# Patient Record
Sex: FEMALE | Race: WHITE | NOT HISPANIC OR LATINO | Employment: OTHER | URBAN - NONMETROPOLITAN AREA
[De-identification: names, ages, dates, MRNs, and addresses within clinical notes are randomized per-mention and may not be internally consistent; named-entity substitution may affect disease eponyms.]

---

## 2017-03-02 RX ORDER — CLOPIDOGREL BISULFATE 75 MG/1
TABLET ORAL
Qty: 30 TABLET | Refills: 5 | OUTPATIENT
Start: 2017-03-02

## 2017-04-25 RX ORDER — ATENOLOL 50 MG/1
TABLET ORAL
Qty: 180 TABLET | Refills: 0 | OUTPATIENT
Start: 2017-04-25

## 2017-06-13 ENCOUNTER — TELEPHONE (OUTPATIENT)
Dept: CARDIOLOGY | Facility: CLINIC | Age: 77
End: 2017-06-13

## 2017-06-13 NOTE — TELEPHONE ENCOUNTER
Patient had called and left message saying that she is allergic to plavix and that it was making her mouth breakout and have chapped lips. She said she is allergic to a lot of medications and would like to come in and be seen to talk about medication options rather than just having a medication called in. Her next appointment is with Luiza on 9/21/17. What are your recommendations?

## 2017-06-23 ENCOUNTER — OFFICE VISIT (OUTPATIENT)
Dept: CARDIOLOGY | Facility: CLINIC | Age: 77
End: 2017-06-23

## 2017-06-23 VITALS
DIASTOLIC BLOOD PRESSURE: 82 MMHG | HEIGHT: 59 IN | WEIGHT: 144 LBS | BODY MASS INDEX: 29.03 KG/M2 | SYSTOLIC BLOOD PRESSURE: 120 MMHG | HEART RATE: 72 BPM

## 2017-06-23 DIAGNOSIS — Z86.73 OLD CEREBROVASCULAR ACCIDENT (CVA) WITHOUT LATE EFFECT: ICD-10-CM

## 2017-06-23 DIAGNOSIS — E78.49 OTHER HYPERLIPIDEMIA: ICD-10-CM

## 2017-06-23 DIAGNOSIS — R22.0 TONGUE SWELLING: ICD-10-CM

## 2017-06-23 DIAGNOSIS — I34.0 NON-RHEUMATIC MITRAL REGURGITATION: ICD-10-CM

## 2017-06-23 DIAGNOSIS — E53.9 VITAMIN B DEFICIENCY: ICD-10-CM

## 2017-06-23 DIAGNOSIS — I10 ESSENTIAL HYPERTENSION: Primary | ICD-10-CM

## 2017-06-23 PROBLEM — E53.8 VITAMIN B 12 DEFICIENCY: Status: ACTIVE | Noted: 2017-06-23

## 2017-06-23 PROCEDURE — 99214 OFFICE O/P EST MOD 30 MIN: CPT | Performed by: NURSE PRACTITIONER

## 2017-06-23 RX ORDER — RANITIDINE HCL 75 MG
75 TABLET ORAL DAILY PRN
COMMUNITY
End: 2020-01-09 | Stop reason: ALTCHOICE

## 2017-06-23 RX ORDER — CLOPIDOGREL BISULFATE 75 MG/1
75 TABLET ORAL DAILY
Qty: 90 TABLET | Refills: 2 | Status: SHIPPED | OUTPATIENT
Start: 2017-06-23 | End: 2018-01-02 | Stop reason: SDUPTHER

## 2017-06-23 RX ORDER — ATENOLOL 50 MG/1
50 TABLET ORAL DAILY
Qty: 90 TABLET | Refills: 2 | Status: SHIPPED | OUTPATIENT
Start: 2017-06-23 | End: 2018-01-02 | Stop reason: SDUPTHER
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
## 2017-07-06 ENCOUNTER — OUTSIDE FACILITY SERVICE (OUTPATIENT)
Dept: CARDIOLOGY | Facility: CLINIC | Age: 77
End: 2017-07-06

## 2017-07-06 ENCOUNTER — HOSPITAL ENCOUNTER (OUTPATIENT)
Dept: CARDIOLOGY | Facility: HOSPITAL | Age: 77
Discharge: HOME | End: 2017-07-06
Admitting: NURSE PRACTITIONER

## 2017-07-06 DIAGNOSIS — I34.0 NON-RHEUMATIC MITRAL REGURGITATION: ICD-10-CM

## 2017-07-06 PROCEDURE — 93306 TTE W/DOPPLER COMPLETE: CPT | Performed by: INTERNAL MEDICINE

## 2017-07-06 PROCEDURE — 93306 TTE W/DOPPLER COMPLETE: CPT

## 2018-01-02 ENCOUNTER — OFFICE VISIT (OUTPATIENT)
Dept: CARDIOLOGY | Facility: CLINIC | Age: 78
End: 2018-01-02

## 2018-01-02 ENCOUNTER — LAB (OUTPATIENT)
Dept: LAB | Facility: HOSPITAL | Age: 78
End: 2018-01-02

## 2018-01-02 VITALS
BODY MASS INDEX: 28.83 KG/M2 | SYSTOLIC BLOOD PRESSURE: 130 MMHG | HEART RATE: 72 BPM | DIASTOLIC BLOOD PRESSURE: 88 MMHG | WEIGHT: 143 LBS | HEIGHT: 59 IN

## 2018-01-02 DIAGNOSIS — R73.9 HYPERGLYCEMIA: ICD-10-CM

## 2018-01-02 DIAGNOSIS — E53.8 VITAMIN B 12 DEFICIENCY: ICD-10-CM

## 2018-01-02 DIAGNOSIS — E78.00 HYPERCHOLESTEREMIA: ICD-10-CM

## 2018-01-02 DIAGNOSIS — I10 ESSENTIAL HYPERTENSION: ICD-10-CM

## 2018-01-02 DIAGNOSIS — Z79.899 MEDICATION MANAGEMENT: ICD-10-CM

## 2018-01-02 DIAGNOSIS — I10 ESSENTIAL HYPERTENSION: Primary | ICD-10-CM

## 2018-01-02 DIAGNOSIS — I34.0 NON-RHEUMATIC MITRAL REGURGITATION: ICD-10-CM

## 2018-01-02 DIAGNOSIS — Z86.73 OLD CEREBROVASCULAR ACCIDENT (CVA) WITHOUT LATE EFFECT: ICD-10-CM

## 2018-01-02 LAB
ALBUMIN SERPL-MCNC: 4.4 G/DL (ref 3.4–4.8)
ALBUMIN/GLOB SERPL: 1.3 G/DL (ref 1.5–2.5)
ALP SERPL-CCNC: 77 U/L (ref 35–104)
ALT SERPL W P-5'-P-CCNC: 13 U/L (ref 10–36)
ANION GAP SERPL CALCULATED.3IONS-SCNC: 7.4 MMOL/L (ref 3.6–11.2)
AST SERPL-CCNC: 19 U/L (ref 10–30)
BILIRUB SERPL-MCNC: 0.8 MG/DL (ref 0.2–1.8)
BUN BLD-MCNC: 19 MG/DL (ref 7–21)
BUN/CREAT SERPL: 15.8 (ref 7–25)
CALCIUM SPEC-SCNC: 10.4 MG/DL (ref 7.7–10)
CHLORIDE SERPL-SCNC: 106 MMOL/L (ref 99–112)
CHOLEST SERPL-MCNC: 193 MG/DL (ref 0–200)
CO2 SERPL-SCNC: 28.6 MMOL/L (ref 24.3–31.9)
CREAT BLD-MCNC: 1.2 MG/DL (ref 0.43–1.29)
GFR SERPL CREATININE-BSD FRML MDRD: 43 ML/MIN/1.73
GLOBULIN UR ELPH-MCNC: 3.5 GM/DL
GLUCOSE BLD-MCNC: 119 MG/DL (ref 70–110)
HBA1C MFR BLD: 6 % (ref 4.5–5.7)
HDLC SERPL-MCNC: 59 MG/DL (ref 60–100)
LDLC SERPL CALC-MCNC: 109 MG/DL (ref 0–100)
LDLC/HDLC SERPL: 1.84 {RATIO}
OSMOLALITY SERPL CALC.SUM OF ELEC: 286.5 MOSM/KG (ref 273–305)
POTASSIUM BLD-SCNC: 5 MMOL/L (ref 3.5–5.3)
PROT SERPL-MCNC: 7.9 G/DL (ref 6–8)
SODIUM BLD-SCNC: 142 MMOL/L (ref 135–153)
TRIGL SERPL-MCNC: 127 MG/DL (ref 0–150)
VIT B12 BLD-MCNC: 1076 PG/ML (ref 211–911)
VLDLC SERPL-MCNC: 25.4 MG/DL

## 2018-01-02 PROCEDURE — 83036 HEMOGLOBIN GLYCOSYLATED A1C: CPT | Performed by: NURSE PRACTITIONER

## 2018-01-02 PROCEDURE — 80061 LIPID PANEL: CPT | Performed by: NURSE PRACTITIONER

## 2018-01-02 PROCEDURE — 82607 VITAMIN B-12: CPT | Performed by: NURSE PRACTITIONER

## 2018-01-02 PROCEDURE — 80053 COMPREHEN METABOLIC PANEL: CPT | Performed by: NURSE PRACTITIONER

## 2018-01-02 PROCEDURE — 36415 COLL VENOUS BLD VENIPUNCTURE: CPT

## 2018-01-02 PROCEDURE — 99213 OFFICE O/P EST LOW 20 MIN: CPT | Performed by: NURSE PRACTITIONER

## 2018-01-02 RX ORDER — ATENOLOL 50 MG/1
50 TABLET ORAL DAILY
Qty: 90 TABLET | Refills: 2 | Status: SHIPPED | OUTPATIENT
Start: 2018-01-02 | End: 2018-07-02 | Stop reason: SDUPTHER

## 2018-01-02 RX ORDER — CLOPIDOGREL BISULFATE 75 MG/1
75 TABLET ORAL DAILY
Qty: 90 TABLET | Refills: 2 | Status: SHIPPED | OUTPATIENT
Start: 2018-01-02 | End: 2018-07-02 | Stop reason: SDUPTHER

## 2018-01-02 NOTE — PROGRESS NOTES
Chief Complaint   Patient presents with   • Follow-up     For cardiac management. Last lab work was done on 06/26/17 per Luiza, in chart.    • Med Refill     Needs refills on cardiac medications. 90 day supply to [facility].       Subjective       Mirna Bravo is a 77 y.o. female  has history of hypertension, hypercholesterolemia who had a stroke about 3 years ago involving the right occipital lobe. She did have elevated cholesterol at that time, but has not able to tolerate any statins.   Today she comes to the office for a follow up visit and denies chest pain or palpitations. Her main issues are mid back pain due to kyphosis and oral issues related to sore gums and tongue. She admits to occasional issues with gastric reflux with certain foods.     HPI         Cardiac History:    Past Surgical History:   Procedure Laterality Date   • CARDIOVASCULAR STRESS TEST  06/06/2007    4 min,16 secs., 80% THR, Negative.   • ECHO - CONVERTED  06/06/2007    EF >60%, mild to moderate MR.   • ECHO - CONVERTED  07/01/2009    EF 50-55%, redundant mitral valve.   • ECHO - CONVERTED  03/07/2013    [facility]- EF 65%. Septal WMA.   • MRI INTRAOPERATIVE BRAIN W/ OR W/O CONTRAST  03/06/2013    Subacute Stroke in R Occipital Lobe.   • US CAROTID UNILATERAL  03/07/2013    Normal       Current Outpatient Prescriptions   Medication Sig Dispense Refill   • acetaminophen (TYLENOL) 325 MG tablet Take 650 mg by mouth every 6 (six) hours as needed for mild pain (1-3).     • atenolol (TENORMIN) 50 MG tablet Take 1 tablet by mouth Daily. 90 tablet 2   • calcium carbonate (OS-GRIS) 600 MG tablet Take 600 mg by mouth 2 (two) times a day with meals.     • cholecalciferol (VITAMIN D3) 1000 UNITS tablet Take 1,000 Units by mouth 2 (two) times a day.     • clopidogrel (PLAVIX) 75 MG tablet Take 1 tablet by mouth Daily. 90 tablet 2   • GARLIC PO Take  by mouth.     • Omega-3 Fatty Acids (FISH OIL) 1200 MG capsule delayed-release Take  by mouth 3  (three) times a day.     • raNITIdine (ZANTAC) 75 MG tablet Take 75 mg by mouth Daily As Needed for Indigestion or Heartburn.     • vitamin B-12 (CYANOCOBALAMIN) 1000 MCG tablet Take 1,000 mcg by mouth daily.       No current facility-administered medications for this visit.        Codeine; Levaquin [levofloxacin]; Niaspan [niacin er]; Penicillins; Pravastatin; Vytorin [ezetimibe-simvastatin]; and Zocor [simvastatin]    Past Medical History:   Diagnosis Date   • CVA (cerebral infarction)    • DVT of proximal leg (deep vein thrombosis)     Left leg   • History of bilateral cataract extraction    • History of kyphoplasty    • Hyperlipidemia    • Hypertension    • MVP (mitral valve prolapse)    • Osteopenia    • Osteoporosis    • PUD (peptic ulcer disease)        Social History     Social History   • Marital status:      Spouse name: N/A   • Number of children: N/A   • Years of education: N/A     Occupational History   • Not on file.     Social History Main Topics   • Smoking status: Never Smoker   • Smokeless tobacco: Never Used   • Alcohol use No   • Drug use: No   • Sexual activity: Not on file     Other Topics Concern   • Not on file     Social History Narrative       Family History   Problem Relation Age of Onset   • Stroke Mother    • Hypertension Mother    • Diabetes Mother    • Hypertension Other    • Hyperlipidemia Other    • Diabetes Other    • Hypertension Child    • Diabetes Child        Review of Systems   Constitution: Positive for malaise/fatigue. Negative for decreased appetite, diaphoresis and fever.   HENT: Positive for sore throat (tongue and upper throat area). Negative for congestion and nosebleeds.    Eyes: Negative for blurred vision and visual disturbance.   Cardiovascular: Negative for chest pain, dyspnea on exertion, leg swelling, near-syncope and palpitations.   Respiratory: Positive for shortness of breath. Negative for cough and sleep disturbances due to breathing (sleeps in recliner  "due to back pain).    Endocrine: Negative for polydipsia, polyphagia and polyuria.   Hematologic/Lymphatic: Negative for bleeding problem. Does not bruise/bleed easily.   Skin: Negative for itching, nail changes and rash.   Musculoskeletal: Positive for arthritis, back pain and falls. Negative for myalgias.   Gastrointestinal: Positive for heartburn (at times). Negative for abdominal pain, change in bowel habit, dysphagia, melena and nausea.   Genitourinary: Negative for dysuria and hematuria.   Neurological: Positive for loss of balance. Negative for dizziness, light-headedness, seizures and vertigo.   Psychiatric/Behavioral: Negative for altered mental status. The patient is not nervous/anxious.    Allergic/Immunologic: Negative for environmental allergies and hives.      Diabetes- No  Thyroid-normal    Objective     /88 (BP Location: Left arm)  Pulse 72  Ht 149.9 cm (59.02\")  Wt 64.9 kg (143 lb)  BMI 28.87 kg/m2    Physical Exam   Constitutional: She is oriented to person, place, and time. She appears well-nourished.   HENT:   Head: Normocephalic.   Mouth/Throat: No oral lesions.   Eyes: Conjunctivae are normal. Pupils are equal, round, and reactive to light.   Neck: Normal range of motion. Carotid bruit is not present. No thyromegaly present.   Cardiovascular: Normal rate, regular rhythm, S1 normal, S2 normal and normal pulses.    Murmur heard.   Systolic murmur is present with a grade of 2/6   Pulmonary/Chest: Effort normal and breath sounds normal. She has no wheezes. She has no rales.   Abdominal: Soft. Bowel sounds are normal. She exhibits no distension. There is no tenderness.   Musculoskeletal: Normal range of motion. She exhibits no edema.   Neurological: She is alert and oriented to person, place, and time.   Skin: Skin is warm and dry.   Psychiatric: She has a normal mood and affect. Her speech is normal and behavior is normal.   Vitals reviewed.     Procedures        Assessment/Plan  "     Mirna was seen today for follow-up and med refill.    Diagnoses and all orders for this visit:    Essential hypertension  -     Comprehensive Metabolic Panel; Future    Hypercholesteremia  -     Lipid Panel; Future    Non-rheumatic mitral regurgitation    Old cerebrovascular accident (CVA) without late effect    Hyperglycemia  -     Comprehensive Metabolic Panel; Future  -     Hemoglobin A1c; Future    Vitamin B 12 deficiency  -     Vitamin B12; Future    Medication management  -     Comprehensive Metabolic Panel; Future  -     Lipid Panel; Future  -     Vitamin B12; Future  -     Hemoglobin A1c; Future    Other orders  -     atenolol (TENORMIN) 50 MG tablet; Take 1 tablet by mouth Daily.  -     clopidogrel (PLAVIX) 75 MG tablet; Take 1 tablet by mouth Daily.      From a cardiac standpoint Ms. Bravo appears stable. Blood pressure and heart rate are normal. Same cardiac medications recommended and refills given. Last lab report reviewed which showed LDL elevated but fairly well controlled with diet given she is not able to tolerate lipid lowering agents. Regarding oral symptoms I advised her on referral to ENT or gastroenterologist. She plans to call to make appointment. Regarding back issues she follows with Dr. Basil Ortiz. Consider physical therapy for symptoms. A repeat lab order given to her as noted above. No cardiac testing warranted at this time. We will see her back in 6 months or sooner for problems.              Electronically signed by AMARILIS Mcgill,  January 2, 2018 11:47 AM

## 2018-01-02 NOTE — PATIENT INSTRUCTIONS
Fat and Cholesterol Restricted Diet  High levels of fat and cholesterol in your blood may lead to various health problems, such as diseases of the heart, blood vessels, gallbladder, liver, and pancreas. Fats are concentrated sources of energy that come in various forms. Certain types of fat, including saturated fat, may be harmful in excess. Cholesterol is a substance needed by your body in small amounts. Your body makes all the cholesterol it needs. Excess cholesterol comes from the food you eat.  When you have high levels of cholesterol and saturated fat in your blood, health problems can develop because the excess fat and cholesterol will gather along the walls of your blood vessels, causing them to narrow. Choosing the right foods will help you control your intake of fat and cholesterol. This will help keep the levels of these substances in your blood within normal limits and reduce your risk of disease.  WHAT IS MY PLAN?  Your health care provider recommends that you:  · Get no more than __________ % of the total calories in your daily diet from fat.  · Limit your intake of saturated fat to less than ______% of your total calories each day.  · Limit the amount of cholesterol in your diet to less than _________mg per day.  WHAT TYPES OF FAT SHOULD I CHOOSE?  · Choose healthy fats more often. Choose monounsaturated and polyunsaturated fats, such as olive and canola oil, flaxseeds, walnuts, almonds, and seeds.  · Eat more omega-3 fats. Good choices include salmon, mackerel, sardines, tuna, flaxseed oil, and ground flaxseeds. Aim to eat fish at least two times a week.  · Limit saturated fats. Saturated fats are primarily found in animal products, such as meats, butter, and cream. Plant sources of saturated fats include palm oil, palm kernel oil, and coconut oil.  · Avoid foods with partially hydrogenated oils in them. These contain trans fats. Examples of foods that contain trans fats are stick margarine, some tub  "margarines, cookies, crackers, and other baked goods.  WHAT GENERAL GUIDELINES DO I NEED TO FOLLOW?  These guidelines for healthy eating will help you control your intake of fat and cholesterol:  · Check food labels carefully to identify foods with trans fats or high amounts of saturated fat.  · Fill one half of your plate with vegetables and green salads.  · Fill one fourth of your plate with whole grains. Look for the word \"whole\" as the first word in the ingredient list.  · Fill one fourth of your plate with lean protein foods.  · Limit fruit to two servings a day. Choose fruit instead of juice.  · Eat more foods that contain soluble fiber. Examples of foods that contain this type of fiber are apples, broccoli, carrots, beans, peas, and barley. Aim to get 20-30 g of fiber per day.  · Eat more home-cooked food and less restaurant, buffet, and fast food.  · Limit or avoid alcohol.  · Limit foods high in starch and sugar.  · Limit fried foods.  · Cook foods using methods other than frying. Baking, boiling, grilling, and broiling are all great options.  · Lose weight if you are overweight. Losing just 5-10% of your initial body weight can help your overall health and prevent diseases such as diabetes and heart disease.  WHAT FOODS CAN I EAT?  Grains  Whole grains, such as whole wheat or whole grain breads, crackers, cereals, and pasta. Unsweetened oatmeal, bulgur, barley, quinoa, or brown rice. Corn or whole wheat flour tortillas.  Vegetables  Fresh or frozen vegetables (raw, steamed, roasted, or grilled). Green salads.  Fruits  All fresh, canned (in natural juice), or frozen fruits.  Meat and Other Protein Products  Ground beef (85% or leaner), grass-fed beef, or beef trimmed of fat. Skinless chicken or turkey. Ground chicken or turkey. Pork trimmed of fat. All fish and seafood. Eggs. Dried beans, peas, or lentils. Unsalted nuts or seeds. Unsalted canned or dry beans.  Dairy  Low-fat dairy products, such as skim or " 1% milk, 2% or reduced-fat cheeses, low-fat ricotta or cottage cheese, or plain low-fat yogurt.  Fats and Oils  Tub margarines without trans fats. Light or reduced-fat mayonnaise and salad dressings. Avocado. Olive, canola, sesame, or safflower oils. Natural peanut or almond butter (choose ones without added sugar and oil).  The items listed above may not be a complete list of recommended foods or beverages. Contact your dietitian for more options.  WHAT FOODS ARE NOT RECOMMENDED?  Grains  White bread. White pasta. White rice. Cornbread. Bagels, pastries, and croissants. Crackers that contain trans fat.  Vegetables  White potatoes. Corn. Creamed or fried vegetables. Vegetables in a cheese sauce.  Fruits  Dried fruits. Canned fruit in light or heavy syrup. Fruit juice.  Meat and Other Protein Products  Fatty cuts of meat. Ribs, chicken wings, wynne, sausage, bologna, salami, chitterlings, fatback, hot dogs, bratwurst, and packaged luncheon meats. Liver and organ meats.  Dairy  Whole or 2% milk, cream, half-and-half, and cream cheese. Whole milk cheeses. Whole-fat or sweetened yogurt. Full-fat cheeses. Nondairy creamers and whipped toppings. Processed cheese, cheese spreads, or cheese curds.  Sweets and Desserts  Corn syrup, sugars, honey, and molasses. Candy. Jam and jelly. Syrup. Sweetened cereals. Cookies, pies, cakes, donuts, muffins, and ice cream.  Fats and Oils  Butter, stick margarine, lard, shortening, ghee, or wynne fat. Coconut, palm kernel, or palm oils.  Beverages  Alcohol. Sweetened drinks (such as sodas, lemonade, and fruit drinks or punches).  The items listed above may not be a complete list of foods and beverages to avoid. Contact your dietitian for more information.     This information is not intended to replace advice given to you by your health care provider. Make sure you discuss any questions you have with your health care provider.     Document Released: 12/18/2006 Document Revised: 01/08/2016  Document Reviewed: 03/18/2015  [employer] Patient Education ©2017 [employer]

## 2018-05-21 RX ORDER — ATENOLOL 50 MG/1
TABLET ORAL
Qty: 90 TABLET | Refills: 2 | OUTPATIENT
Start: 2018-05-21

## 2018-07-02 ENCOUNTER — OFFICE VISIT (OUTPATIENT)
Dept: CARDIOLOGY | Facility: CLINIC | Age: 78
End: 2018-07-02

## 2018-07-02 ENCOUNTER — TELEPHONE (OUTPATIENT)
Dept: CARDIOLOGY | Facility: CLINIC | Age: 78
End: 2018-07-02

## 2018-07-02 ENCOUNTER — LAB (OUTPATIENT)
Dept: LAB | Facility: HOSPITAL | Age: 78
End: 2018-07-02

## 2018-07-02 VITALS
DIASTOLIC BLOOD PRESSURE: 80 MMHG | HEART RATE: 64 BPM | WEIGHT: 142 LBS | SYSTOLIC BLOOD PRESSURE: 120 MMHG | BODY MASS INDEX: 28.63 KG/M2 | HEIGHT: 59 IN

## 2018-07-02 DIAGNOSIS — E83.52 HYPERCALCEMIA: ICD-10-CM

## 2018-07-02 DIAGNOSIS — E78.00 PURE HYPERCHOLESTEROLEMIA: ICD-10-CM

## 2018-07-02 DIAGNOSIS — E53.8 VITAMIN B12 DEFICIENCY: ICD-10-CM

## 2018-07-02 DIAGNOSIS — E83.52 HYPERCALCEMIA: Primary | ICD-10-CM

## 2018-07-02 DIAGNOSIS — I10 ESSENTIAL HYPERTENSION: Primary | ICD-10-CM

## 2018-07-02 DIAGNOSIS — Z86.73 OLD CEREBROVASCULAR ACCIDENT (CVA) WITHOUT LATE EFFECT: ICD-10-CM

## 2018-07-02 DIAGNOSIS — I10 ESSENTIAL HYPERTENSION: ICD-10-CM

## 2018-07-02 DIAGNOSIS — E66.3 OVERWEIGHT: ICD-10-CM

## 2018-07-02 LAB
ALBUMIN SERPL-MCNC: 4.6 G/DL (ref 3.4–4.8)
ALBUMIN/GLOB SERPL: 1.3 G/DL (ref 1.5–2.5)
ALP SERPL-CCNC: 75 U/L (ref 35–104)
ALT SERPL W P-5'-P-CCNC: 19 U/L (ref 10–36)
ANION GAP SERPL CALCULATED.3IONS-SCNC: 8.8 MMOL/L (ref 3.6–11.2)
AST SERPL-CCNC: 22 U/L (ref 10–30)
BILIRUB SERPL-MCNC: 0.8 MG/DL (ref 0.2–1.8)
BUN BLD-MCNC: 24 MG/DL (ref 7–21)
BUN/CREAT SERPL: 18.8 (ref 7–25)
CALCIUM SPEC-SCNC: 10.8 MG/DL (ref 7.7–10)
CHLORIDE SERPL-SCNC: 105 MMOL/L (ref 99–112)
CHOLEST SERPL-MCNC: 189 MG/DL (ref 0–200)
CO2 SERPL-SCNC: 25.2 MMOL/L (ref 24.3–31.9)
CREAT BLD-MCNC: 1.28 MG/DL (ref 0.43–1.29)
DEPRECATED RDW RBC AUTO: 46.6 FL (ref 37–54)
ERYTHROCYTE [DISTWIDTH] IN BLOOD BY AUTOMATED COUNT: 14.2 % (ref 11.5–14.5)
GFR SERPL CREATININE-BSD FRML MDRD: 40 ML/MIN/1.73
GLOBULIN UR ELPH-MCNC: 3.6 GM/DL
GLUCOSE BLD-MCNC: 122 MG/DL (ref 70–110)
HCT VFR BLD AUTO: 45.6 % (ref 37–47)
HDLC SERPL-MCNC: 61 MG/DL (ref 60–100)
HGB BLD-MCNC: 14.8 G/DL (ref 12–16)
LDLC SERPL CALC-MCNC: 99 MG/DL (ref 0–100)
LDLC/HDLC SERPL: 1.62 {RATIO}
MCH RBC QN AUTO: 29.7 PG (ref 27–33)
MCHC RBC AUTO-ENTMCNC: 32.5 G/DL (ref 33–37)
MCV RBC AUTO: 91.4 FL (ref 80–94)
OSMOLALITY SERPL CALC.SUM OF ELEC: 282.9 MOSM/KG (ref 273–305)
PLATELET # BLD AUTO: 235 10*3/MM3 (ref 130–400)
PMV BLD AUTO: 11.4 FL (ref 6–10)
POTASSIUM BLD-SCNC: 4.3 MMOL/L (ref 3.5–5.3)
PROT SERPL-MCNC: 8.2 G/DL (ref 6–8)
PTH-INTACT SERPL-MCNC: 87.9 PG/ML
RBC # BLD AUTO: 4.99 10*6/MM3 (ref 4.2–5.4)
SODIUM BLD-SCNC: 139 MMOL/L (ref 135–153)
TRIGL SERPL-MCNC: 146 MG/DL (ref 0–150)
TSH SERPL DL<=0.05 MIU/L-ACNC: 2.16 MIU/ML (ref 0.55–4.78)
VIT B12 BLD-MCNC: 1560 PG/ML (ref 211–911)
VLDLC SERPL-MCNC: 29.2 MG/DL
WBC NRBC COR # BLD: 8.21 10*3/MM3 (ref 4.5–12.5)

## 2018-07-02 PROCEDURE — 83970 ASSAY OF PARATHORMONE: CPT | Performed by: NURSE PRACTITIONER

## 2018-07-02 PROCEDURE — 99213 OFFICE O/P EST LOW 20 MIN: CPT | Performed by: NURSE PRACTITIONER

## 2018-07-02 PROCEDURE — 84443 ASSAY THYROID STIM HORMONE: CPT | Performed by: NURSE PRACTITIONER

## 2018-07-02 PROCEDURE — 36415 COLL VENOUS BLD VENIPUNCTURE: CPT

## 2018-07-02 PROCEDURE — 82607 VITAMIN B-12: CPT | Performed by: NURSE PRACTITIONER

## 2018-07-02 PROCEDURE — 85027 COMPLETE CBC AUTOMATED: CPT | Performed by: NURSE PRACTITIONER

## 2018-07-02 PROCEDURE — 80061 LIPID PANEL: CPT | Performed by: NURSE PRACTITIONER

## 2018-07-02 PROCEDURE — 80053 COMPREHEN METABOLIC PANEL: CPT | Performed by: NURSE PRACTITIONER

## 2018-07-02 RX ORDER — ATENOLOL 50 MG/1
25 TABLET ORAL 2 TIMES DAILY
Qty: 90 TABLET | Refills: 2 | Status: SHIPPED | OUTPATIENT
Start: 2018-07-02 | End: 2019-01-08 | Stop reason: SDUPTHER

## 2018-07-02 RX ORDER — CLOPIDOGREL BISULFATE 75 MG/1
75 TABLET ORAL DAILY
Qty: 90 TABLET | Refills: 2 | Status: SHIPPED | OUTPATIENT
Start: 2018-07-02 | End: 2019-01-08 | Stop reason: SDUPTHER

## 2018-07-02 NOTE — PROGRESS NOTES
Calcium is high, please let her know to limit her supplement to once daily.  I am putting in an order for P[facility].  I will see if they can add to blood in lab.  B12 normal

## 2018-07-09 ENCOUNTER — TELEPHONE (OUTPATIENT)
Dept: CARDIOLOGY | Facility: CLINIC | Age: 78
End: 2018-07-09

## 2018-07-09 NOTE — TELEPHONE ENCOUNTER
Patient aware to come in tomorrow for EKG. Patient was told to come at 9 am, she verbalized understanding.

## 2018-07-09 NOTE — TELEPHONE ENCOUNTER
Please see if we can add her to the EKG schedule tomorrow if patient can be here.  I would like to reassess QT interval with magnesium change.

## 2018-07-10 ENCOUNTER — CLINICAL SUPPORT (OUTPATIENT)
Dept: CARDIOLOGY | Facility: CLINIC | Age: 78
End: 2018-07-10

## 2018-07-10 ENCOUNTER — TELEPHONE (OUTPATIENT)
Dept: CARDIOLOGY | Facility: CLINIC | Age: 78
End: 2018-07-10

## 2018-07-10 DIAGNOSIS — Z87.898 HX OF PROLONGED Q-T INTERVAL ON ECG: Primary | ICD-10-CM

## 2018-07-10 PROCEDURE — 93000 ELECTROCARDIOGRAM COMPLETE: CPT | Performed by: NURSE PRACTITIONER

## 2018-07-10 NOTE — PROGRESS NOTES
Procedure     ECG 12 Lead  Date/Time: 7/10/2018 1:40 PM  Performed by: LEFTY JOSHI  Authorized by: LEFTY JOSHI   Rhythm: sinus rhythm  BPM: 67  Clinical impression: abnormal ECG  Comments: Normal QT

## 2019-01-08 ENCOUNTER — OFFICE VISIT (OUTPATIENT)
Dept: CARDIOLOGY | Facility: CLINIC | Age: 79
End: 2019-01-08

## 2019-01-08 ENCOUNTER — LAB (OUTPATIENT)
Dept: LAB | Facility: HOSPITAL | Age: 79
End: 2019-01-08

## 2019-01-08 VITALS
HEART RATE: 72 BPM | SYSTOLIC BLOOD PRESSURE: 110 MMHG | DIASTOLIC BLOOD PRESSURE: 70 MMHG | WEIGHT: 141 LBS | HEIGHT: 59 IN | BODY MASS INDEX: 28.43 KG/M2

## 2019-01-08 DIAGNOSIS — E78.2 MIXED HYPERLIPIDEMIA: ICD-10-CM

## 2019-01-08 DIAGNOSIS — I10 ESSENTIAL HYPERTENSION: ICD-10-CM

## 2019-01-08 DIAGNOSIS — Z86.73 OLD CEREBROVASCULAR ACCIDENT (CVA) WITHOUT LATE EFFECT: ICD-10-CM

## 2019-01-08 DIAGNOSIS — Z51.81 MEDICATION MONITORING ENCOUNTER: ICD-10-CM

## 2019-01-08 DIAGNOSIS — I10 ESSENTIAL HYPERTENSION: Primary | ICD-10-CM

## 2019-01-08 LAB
ALBUMIN SERPL-MCNC: 4.6 G/DL (ref 3.4–4.8)
ALBUMIN/GLOB SERPL: 1.3 G/DL (ref 1.5–2.5)
ALP SERPL-CCNC: 87 U/L (ref 35–104)
ALT SERPL W P-5'-P-CCNC: 16 U/L (ref 10–36)
ANION GAP SERPL CALCULATED.3IONS-SCNC: 10.6 MMOL/L (ref 3.6–11.2)
AST SERPL-CCNC: 21 U/L (ref 10–30)
BILIRUB SERPL-MCNC: 0.7 MG/DL (ref 0.2–1.8)
BUN BLD-MCNC: 22 MG/DL (ref 7–21)
BUN/CREAT SERPL: 17.1 (ref 7–25)
CALCIUM SPEC-SCNC: 10.6 MG/DL (ref 7.7–10)
CHLORIDE SERPL-SCNC: 104 MMOL/L (ref 99–112)
CHOLEST SERPL-MCNC: 208 MG/DL (ref 0–200)
CO2 SERPL-SCNC: 25.4 MMOL/L (ref 24.3–31.9)
CREAT BLD-MCNC: 1.29 MG/DL (ref 0.43–1.29)
DEPRECATED RDW RBC AUTO: 46.1 FL (ref 37–54)
ERYTHROCYTE [DISTWIDTH] IN BLOOD BY AUTOMATED COUNT: 14.1 % (ref 11.5–14.5)
GFR SERPL CREATININE-BSD FRML MDRD: 40 ML/MIN/1.73
GLOBULIN UR ELPH-MCNC: 3.5 GM/DL
GLUCOSE BLD-MCNC: 129 MG/DL (ref 70–110)
HCT VFR BLD AUTO: 48.4 % (ref 37–47)
HDLC SERPL-MCNC: 60 MG/DL (ref 60–100)
HGB BLD-MCNC: 15.3 G/DL (ref 12–16)
LDLC SERPL CALC-MCNC: 116 MG/DL (ref 0–100)
LDLC/HDLC SERPL: 1.94 {RATIO}
MCH RBC QN AUTO: 29.1 PG (ref 27–33)
MCHC RBC AUTO-ENTMCNC: 31.6 G/DL (ref 33–37)
MCV RBC AUTO: 92.2 FL (ref 80–94)
OSMOLALITY SERPL CALC.SUM OF ELEC: 284.4 MOSM/KG (ref 273–305)
PLATELET # BLD AUTO: 243 10*3/MM3 (ref 130–400)
PMV BLD AUTO: 11.6 FL (ref 6–10)
POTASSIUM BLD-SCNC: 5.1 MMOL/L (ref 3.5–5.3)
PROT SERPL-MCNC: 8.1 G/DL (ref 6–8)
RBC # BLD AUTO: 5.25 10*6/MM3 (ref 4.2–5.4)
SODIUM BLD-SCNC: 140 MMOL/L (ref 135–153)
TRIGL SERPL-MCNC: 158 MG/DL (ref 0–150)
TSH SERPL DL<=0.05 MIU/L-ACNC: 3.14 MIU/ML (ref 0.55–4.78)
VLDLC SERPL-MCNC: 31.6 MG/DL
WBC NRBC COR # BLD: 9.16 10*3/MM3 (ref 4.5–12.5)

## 2019-01-08 PROCEDURE — 80061 LIPID PANEL: CPT | Performed by: NURSE PRACTITIONER

## 2019-01-08 PROCEDURE — 99213 OFFICE O/P EST LOW 20 MIN: CPT | Performed by: NURSE PRACTITIONER

## 2019-01-08 PROCEDURE — 80053 COMPREHEN METABOLIC PANEL: CPT | Performed by: NURSE PRACTITIONER

## 2019-01-08 PROCEDURE — 36415 COLL VENOUS BLD VENIPUNCTURE: CPT

## 2019-01-08 PROCEDURE — 85027 COMPLETE CBC AUTOMATED: CPT | Performed by: NURSE PRACTITIONER

## 2019-01-08 PROCEDURE — 84443 ASSAY THYROID STIM HORMONE: CPT | Performed by: NURSE PRACTITIONER

## 2019-01-08 RX ORDER — ATENOLOL 50 MG/1
25 TABLET ORAL 2 TIMES DAILY
Qty: 90 TABLET | Refills: 2 | Status: SHIPPED | OUTPATIENT
Start: 2019-01-08 | End: 2019-07-10 | Stop reason: SDUPTHER

## 2019-01-08 RX ORDER — CLOPIDOGREL BISULFATE 75 MG/1
75 TABLET ORAL DAILY
Qty: 90 TABLET | Refills: 2 | Status: SHIPPED | OUTPATIENT
Start: 2019-01-08 | End: 2019-07-10 | Stop reason: SDUPTHER

## 2019-01-08 NOTE — PROGRESS NOTES
"Chief Complaint   Patient presents with   • Follow-up     Cardiac management. Last labs in chart. She reports B/P stable at home.   • Dizziness     She reports had one episode of \"odd dizziness\" the other night after she ate. She states \"had been a while before I ate\".    • Shortness of Breath     Only with over exertion or allergies.   • Med Refill     Needs refills on Atenolol and Plavix-90 day.       Subjective       Mirna Bravo is an 78 y.o. female with hypertension, hypercholesterolemia, and mitral regurgitation who had a stroke in 2013 involving the right occipital lobe. Carotid US and echo were unremarkable. Cholesterol was elevated at that time but has been unable to tolerate statin therapy. She has been managed medically and has done very well. Her last echocardiogram in 2017 showed normal LVEF and mild to mod MR. She came today for follow up. She denies chest pain. Occasional shortness of breath with moderate exertion. No TIA or stroke like symptoms.  Lipids in July 2018 were well controlled with diet at , Tri 146, HDL 61, LDL 99. Calcium was elevated with normal PTH. Her main problem is arthritis and kyphosis but she is able to maintain ADLs independently if she takes her time. Refills requested for atenolol and Plavix.      HPI         Cardiac History:    Past Surgical History:   Procedure Laterality Date   • CARDIOVASCULAR STRESS TEST  06/06/2007    4 min,16 secs., 80% THR, Negative.   • ECHO - CONVERTED  06/06/2007    EF >60%, mild to moderate MR.   • ECHO - CONVERTED  07/01/2009    EF 50-55%, redundant mitral valve.   • ECHO - CONVERTED  03/07/2013    [facility]- EF 65%. Septal WMA.   • ECHO - CONVERTED  07/06/2017    EF 55-60%, mild to mod MR    • MRI INTRAOPERATIVE BRAIN W/ OR W/O CONTRAST  03/06/2013    Subacute Stroke in R Occipital Lobe.   • US CAROTID UNILATERAL  03/07/2013    Normal       Current Outpatient Medications   Medication Sig Dispense Refill   • acetaminophen (TYLENOL) 325 MG tablet " Take 650 mg by mouth every 6 (six) hours as needed for mild pain (1-3).     • atenolol (TENORMIN) 50 MG tablet Take 0.5 tablets by mouth 2 (Two) Times a Day. 90 tablet 2   • Calcium Citrate-Vitamin D (CALCIUM CITRATE + D PO) Take  by mouth Daily.     • clopidogrel (PLAVIX) 75 MG tablet Take 1 tablet by mouth Daily. 90 tablet 2   • GARLIC PO Take  by mouth Every Night.     • Omega-3 Fatty Acids (FISH OIL) 1200 MG capsule delayed-release Take  by mouth 3 (three) times a day.     • raNITIdine (ZANTAC) 75 MG tablet Take 75 mg by mouth Daily As Needed for Indigestion or Heartburn.     • vitamin B-12 (CYANOCOBALAMIN) 1000 MCG tablet Take 1,000 mcg by mouth daily.       No current facility-administered medications for this visit.        Codeine; Levaquin [levofloxacin]; Niaspan [niacin er]; Penicillins; Pravastatin; Vytorin [ezetimibe-simvastatin]; and Zocor [simvastatin]    Past Medical History:   Diagnosis Date   • CVA (cerebral infarction)    • DVT of proximal leg (deep vein thrombosis) (CMS/HCC)     Left leg   • History of bilateral cataract extraction    • History of kyphoplasty    • Hyperlipidemia    • Hypertension    • MVP (mitral valve prolapse)    • Osteopenia    • Osteoporosis    • PUD (peptic ulcer disease)        Social History     Socioeconomic History   • Marital status:      Spouse name: Not on file   • Number of children: Not on file   • Years of education: Not on file   • Highest education level: Not on file   Social Needs   • Financial resource strain: Not on file   • Food insecurity - worry: Not on file   • Food insecurity - inability: Not on file   • Transportation needs - medical: Not on file   • Transportation needs - non-medical: Not on file   Occupational History   • Not on file   Tobacco Use   • Smoking status: Never Smoker   • Smokeless tobacco: Never Used   Substance and Sexual Activity   • Alcohol use: No   • Drug use: No   • Sexual activity: Not on file   Other Topics Concern   • Not on  "file   Social History Narrative   • Not on file       Family History   Problem Relation Age of Onset   • Stroke Mother    • Hypertension Mother    • Diabetes Mother    • Hypertension Other    • Hyperlipidemia Other    • Diabetes Other    • Hypertension Child    • Diabetes Child        Review of Systems   Constitution: Positive for weight loss (decreased 1 lb ). Negative for decreased appetite, weakness and malaise/fatigue.   HENT: Negative.    Eyes: Negative.    Cardiovascular: Positive for dyspnea on exertion (mild ). Negative for chest pain, claudication, leg swelling, orthopnea, palpitations and syncope.   Respiratory: Negative for cough and shortness of breath.    Endocrine: Negative.    Hematologic/Lymphatic: Negative.    Skin: Negative.    Musculoskeletal: Positive for arthritis, back pain, joint pain and stiffness. Negative for falls.   Gastrointestinal: Negative for abdominal pain and melena.   Genitourinary: Negative for dysuria and hematuria.   Neurological: Positive for dizziness (one episode after \"wating too long to eat\" ).   Psychiatric/Behavioral: Negative for altered mental status and depression.   Allergic/Immunologic: Negative.         Diabetes- No  Thyroid-normal    Objective     /70 (BP Location: Right arm)   Pulse 72   Ht 149.9 cm (59.02\")   Wt 64 kg (141 lb)   BMI 28.46 kg/m²     Physical Exam   Constitutional: She is oriented to person, place, and time. She appears well-developed and well-nourished.   HENT:   Head: Normocephalic.   Eyes: Pupils are equal, round, and reactive to light.   Neck: Normal range of motion.   Cardiovascular: Normal rate, regular rhythm and intact distal pulses.  No extrasystoles are present.   Murmur heard.   Systolic murmur is present with a grade of 2/6 at the apex.  Pulmonary/Chest: Effort normal and breath sounds normal. No respiratory distress. She has no wheezes. She has no rales.   Abdominal: Soft. Bowel sounds are normal.   Musculoskeletal: Normal " range of motion. She exhibits deformity (significant kyphosis ). She exhibits no edema.   Neurological: She is alert and oriented to person, place, and time.   Skin: Skin is warm and dry. She is not diaphoretic.   Psychiatric: She has a normal mood and affect.   Nursing note and vitals reviewed.     Procedures          Assessment/Plan    Heart rate and blood pressure are stable. We reviewed most recent echo in 2017 showing stable LVEF and mild to moderate MR. We will continue conservative management since she remains asymptomatic. Lipids are controlled with diet alone. Labs ordered today to check CBC, CMP, lipids, and TSH. Copy will be forwarded to you when available. She was encouraged to walk and stretch as she can tolerate. BMI is borderline and she is watching her diet closer limiting sugars and carbs. She appears stable. Refills sent for atenolol and Plavix. She appears stable. We will see her back in six months or sooner if needed.    Mirna was seen today for follow-up, dizziness, shortness of breath and med refill.    Diagnoses and all orders for this visit:    Essential hypertension  -     CBC (No Diff); Future  -     Comprehensive Metabolic Panel; Future  -     Lipid Panel; Future  -     TSH; Future    Old cerebrovascular accident (CVA) without late effect  -     CBC (No Diff); Future  -     Comprehensive Metabolic Panel; Future  -     Lipid Panel; Future  -     TSH; Future    Mixed hyperlipidemia  -     CBC (No Diff); Future  -     Comprehensive Metabolic Panel; Future  -     Lipid Panel; Future  -     TSH; Future    Medication monitoring encounter  -     CBC (No Diff); Future  -     Comprehensive Metabolic Panel; Future  -     Lipid Panel; Future  -     TSH; Future    Other orders  -     atenolol (TENORMIN) 50 MG tablet; Take 0.5 tablets by mouth 2 (Two) Times a Day.  -     clopidogrel (PLAVIX) 75 MG tablet; Take 1 tablet by mouth Daily.        Patient's Body mass index is 28.46 kg/m². BMI is above normal  parameters. Recommendations include: nutrition counseling.               Electronically signed by AMARILIS Archer,  January 8, 2019 10:32 AM

## 2019-07-10 ENCOUNTER — LAB (OUTPATIENT)
Dept: LAB | Facility: HOSPITAL | Age: 79
End: 2019-07-10

## 2019-07-10 ENCOUNTER — OFFICE VISIT (OUTPATIENT)
Dept: CARDIOLOGY | Facility: CLINIC | Age: 79
End: 2019-07-10

## 2019-07-10 VITALS
DIASTOLIC BLOOD PRESSURE: 68 MMHG | BODY MASS INDEX: 28.22 KG/M2 | HEIGHT: 59 IN | SYSTOLIC BLOOD PRESSURE: 130 MMHG | WEIGHT: 140 LBS | HEART RATE: 70 BPM

## 2019-07-10 DIAGNOSIS — E78.2 MIXED HYPERLIPIDEMIA: ICD-10-CM

## 2019-07-10 DIAGNOSIS — Z79.899 MEDICATION MANAGEMENT: ICD-10-CM

## 2019-07-10 DIAGNOSIS — E53.8 VITAMIN B 12 DEFICIENCY: ICD-10-CM

## 2019-07-10 DIAGNOSIS — E55.9 VITAMIN D DEFICIENCY: ICD-10-CM

## 2019-07-10 DIAGNOSIS — I10 ESSENTIAL HYPERTENSION: Primary | ICD-10-CM

## 2019-07-10 DIAGNOSIS — Z86.73 OLD CEREBROVASCULAR ACCIDENT (CVA) WITHOUT LATE EFFECT: ICD-10-CM

## 2019-07-10 DIAGNOSIS — I10 ESSENTIAL HYPERTENSION: ICD-10-CM

## 2019-07-10 LAB
ALBUMIN SERPL-MCNC: 4.05 G/DL (ref 3.5–5.2)
ALBUMIN/GLOB SERPL: 1.1 G/DL
ALP SERPL-CCNC: 75 U/L (ref 39–117)
ALT SERPL W P-5'-P-CCNC: 11 U/L (ref 1–33)
ANION GAP SERPL CALCULATED.3IONS-SCNC: 12.2 MMOL/L (ref 5–15)
AST SERPL-CCNC: 19 U/L (ref 1–32)
BILIRUB SERPL-MCNC: 0.6 MG/DL (ref 0.2–1.2)
BUN BLD-MCNC: 22 MG/DL (ref 8–23)
BUN/CREAT SERPL: 17.7 (ref 7–25)
CALCIUM SPEC-SCNC: 10.9 MG/DL (ref 8.6–10.5)
CHLORIDE SERPL-SCNC: 104 MMOL/L (ref 98–107)
CHOLEST SERPL-MCNC: 197 MG/DL (ref 0–200)
CO2 SERPL-SCNC: 24.8 MMOL/L (ref 22–29)
CREAT BLD-MCNC: 1.24 MG/DL (ref 0.57–1)
DEPRECATED RDW RBC AUTO: 49 FL (ref 37–54)
ERYTHROCYTE [DISTWIDTH] IN BLOOD BY AUTOMATED COUNT: 14.9 % (ref 12.3–15.4)
GFR SERPL CREATININE-BSD FRML MDRD: 42 ML/MIN/1.73
GLOBULIN UR ELPH-MCNC: 3.6 GM/DL
GLUCOSE BLD-MCNC: 118 MG/DL (ref 65–99)
HCT VFR BLD AUTO: 46.5 % (ref 34–46.6)
HDLC SERPL-MCNC: 67 MG/DL (ref 40–60)
HGB BLD-MCNC: 14.4 G/DL (ref 12–15.9)
LDLC SERPL CALC-MCNC: 107 MG/DL (ref 0–100)
LDLC/HDLC SERPL: 1.6 {RATIO}
MCH RBC QN AUTO: 28.9 PG (ref 26.6–33)
MCHC RBC AUTO-ENTMCNC: 31 G/DL (ref 31.5–35.7)
MCV RBC AUTO: 93.2 FL (ref 79–97)
PLATELET # BLD AUTO: 223 10*3/MM3 (ref 140–450)
PMV BLD AUTO: 11.5 FL (ref 6–12)
POTASSIUM BLD-SCNC: 5.5 MMOL/L (ref 3.5–5.2)
PROT SERPL-MCNC: 7.6 G/DL (ref 6–8.5)
RBC # BLD AUTO: 4.99 10*6/MM3 (ref 3.77–5.28)
SODIUM BLD-SCNC: 141 MMOL/L (ref 136–145)
TRIGL SERPL-MCNC: 114 MG/DL (ref 0–150)
VLDLC SERPL-MCNC: 22.8 MG/DL
WBC NRBC COR # BLD: 8.09 10*3/MM3 (ref 3.4–10.8)

## 2019-07-10 PROCEDURE — 82306 VITAMIN D 25 HYDROXY: CPT | Performed by: NURSE PRACTITIONER

## 2019-07-10 PROCEDURE — 80053 COMPREHEN METABOLIC PANEL: CPT | Performed by: NURSE PRACTITIONER

## 2019-07-10 PROCEDURE — 80061 LIPID PANEL: CPT | Performed by: NURSE PRACTITIONER

## 2019-07-10 PROCEDURE — 85027 COMPLETE CBC AUTOMATED: CPT | Performed by: NURSE PRACTITIONER

## 2019-07-10 PROCEDURE — 82607 VITAMIN B-12: CPT | Performed by: NURSE PRACTITIONER

## 2019-07-10 PROCEDURE — 99213 OFFICE O/P EST LOW 20 MIN: CPT | Performed by: NURSE PRACTITIONER

## 2019-07-10 PROCEDURE — 36415 COLL VENOUS BLD VENIPUNCTURE: CPT

## 2019-07-10 RX ORDER — CLOPIDOGREL BISULFATE 75 MG/1
75 TABLET ORAL DAILY
Qty: 90 TABLET | Refills: 2 | Status: SHIPPED | OUTPATIENT
Start: 2019-07-10 | End: 2020-01-09 | Stop reason: SDUPTHER

## 2019-07-10 RX ORDER — ATENOLOL 50 MG/1
25 TABLET ORAL 2 TIMES DAILY
Qty: 90 TABLET | Refills: 2 | Status: SHIPPED | OUTPATIENT
Start: 2019-07-10 | End: 2020-01-09 | Stop reason: SDUPTHER

## 2019-07-10 NOTE — PROGRESS NOTES
Chief Complaint   Patient presents with   • Follow-up     Cardiac management .Does not take Aspirin  . Most recent labs 1-2019 on chart. Has no cardiac complaints today . Has been NPO this morning to have labs done at [facility] lab   • Dizziness     Says it only occurs if her blood sugar  gets low, it resolves after eating.   • Med Refill     Atenelol and Plavix 90 day supply to [facility]       Subjective       Mirna Bravo is a 79 y.o. female with hypertension, hypercholesterolemia, and mitral regurgitation who had a stroke in 2013 involving the right occipital lobe. Carotid US and echo were unremarkable. Cholesterol was elevated at that time but has been unable to tolerate statin therapy. She has been managed medically and has done very well. Her last echocardiogram in 2017 showed normal LVEF and mild to mod MR.    Today Mirna comes to the office for a follow-up visit.  She denies chest pain, palpitations, or issues with swelling.  She states she is able to maintain her normal daily activities only limited by her back pain and issues with arthritis.  No recent medication changes are noted.    HPI     Cardiac History:    Past Surgical History:   Procedure Laterality Date   • CARDIOVASCULAR STRESS TEST  06/06/2007    4 min,16 secs., 80% THR, Negative.   • ECHO - CONVERTED  06/06/2007    EF >60%, mild to moderate MR.   • ECHO - CONVERTED  07/01/2009    EF 50-55%, redundant mitral valve.   • ECHO - CONVERTED  03/07/2013    [facility]- EF 65%. Septal WMA.   • ECHO - CONVERTED  07/06/2017    EF 55-60%, mild to mod MR    • MRI INTRAOPERATIVE BRAIN W/ OR W/O CONTRAST  03/06/2013    Subacute Stroke in R Occipital Lobe.   • US CAROTID UNILATERAL  03/07/2013    Normal       Current Outpatient Medications   Medication Sig Dispense Refill   • acetaminophen (TYLENOL) 325 MG tablet Take 650 mg by mouth every 6 (six) hours as needed for mild pain (1-3).     • atenolol (TENORMIN) 50 MG tablet Take 0.5 tablets by mouth 2 (Two)  Times a Day. 90 tablet 2   • Calcium Citrate-Vitamin D (CALCIUM CITRATE + D PO) Take  by mouth Daily.     • clopidogrel (PLAVIX) 75 MG tablet Take 1 tablet by mouth Daily. 90 tablet 2   • GARLIC PO Take  by mouth Every Night.     • Omega-3 Fatty Acids (FISH OIL) 1200 MG capsule delayed-release Take  by mouth 3 (three) times a day.     • raNITIdine (ZANTAC) 75 MG tablet Take 75 mg by mouth Daily As Needed for Indigestion or Heartburn.     • vitamin B-12 (CYANOCOBALAMIN) 1000 MCG tablet Take 1,000 mcg by mouth daily.       No current facility-administered medications for this visit.        Codeine; Levaquin [levofloxacin]; Niaspan [niacin er]; Penicillins; Pravastatin; Vytorin [ezetimibe-simvastatin]; and Zocor [simvastatin]    Past Medical History:   Diagnosis Date   • CVA (cerebral infarction)    • DVT of proximal leg (deep vein thrombosis) (CMS/HCC)     Left leg   • History of bilateral cataract extraction    • History of kyphoplasty    • Hyperlipidemia    • Hypertension    • MVP (mitral valve prolapse)    • Osteopenia    • Osteoporosis    • PUD (peptic ulcer disease)        Social History     Socioeconomic History   • Marital status:      Spouse name: Not on file   • Number of children: Not on file   • Years of education: Not on file   • Highest education level: Not on file   Tobacco Use   • Smoking status: Never Smoker   • Smokeless tobacco: Never Used   Substance and Sexual Activity   • Alcohol use: No   • Drug use: No       Family History   Problem Relation Age of Onset   • Stroke Mother    • Hypertension Mother    • Diabetes Mother    • Hypertension Other    • Hyperlipidemia Other    • Diabetes Other    • Hypertension Child    • Diabetes Child        Review of Systems   Constitution: Negative for fever.   HENT: Negative for congestion and nosebleeds.    Eyes: Negative for pain and redness.   Cardiovascular: Negative for chest pain, leg swelling, near-syncope and palpitations.   Respiratory: Negative for  "cough, shortness of breath and sleep disturbances due to breathing.    Endocrine: Negative for polydipsia, polyphagia and polyuria.   Hematologic/Lymphatic: Negative for bleeding problem. Does not bruise/bleed easily.   Skin: Negative for flushing and itching.   Musculoskeletal: Positive for arthritis, back pain, joint pain, muscle cramps and stiffness.   Gastrointestinal: Positive for heartburn (at times, Zantac helps). Negative for abdominal pain, change in bowel habit, dysphagia, melena and nausea.   Genitourinary: Negative for dysuria and hematuria.   Neurological: Positive for loss of balance (uses walker).   Psychiatric/Behavioral: Negative for memory loss. The patient is not nervous/anxious.    Allergic/Immunologic: Positive for environmental allergies (at times). Negative for persistent infections.        Objective      Labs 1/8/2019:  glucose 129, BUN 22, creatinine 1.29, sodium 140, potassium 5.1, calcium 10.6, ALT 16, AST 21, GFR 40, total cholesterol 208, triglycerides 158, HDL 60, , RBC 5.25, hemoglobin 15.3, hematocrit 48.4, platelets 243, TSH 3.14.    /68 (BP Location: Left arm)   Pulse 70   Ht 149.9 cm (59\")   Wt 63.5 kg (140 lb)   BMI 28.28 kg/m²     Physical Exam   Constitutional: She is oriented to person, place, and time. She appears well-nourished.   HENT:   Head: Normocephalic.   Eyes: Conjunctivae are normal. Pupils are equal, round, and reactive to light.   Neck: Normal range of motion. Neck supple. Carotid bruit is not present.   Cardiovascular: Normal rate, regular rhythm, S1 normal and S2 normal.   No murmur heard.  Pulmonary/Chest: Breath sounds normal.   Abdominal: Soft. Bowel sounds are normal. She exhibits no distension. There is no tenderness.   Musculoskeletal: Normal range of motion. She exhibits no edema.   Neurological: She is alert and oriented to person, place, and time.   Skin: Skin is warm and dry. No pallor.   Psychiatric: She has a normal mood and affect. " Her speech is normal and behavior is normal. Cognition and memory are normal.        Procedures: none today        Assessment/Plan      Mirna was seen today for follow-up, dizziness and med refill.    Diagnoses and all orders for this visit:    Essential hypertension  -     Comprehensive Metabolic Panel; Future  -     CBC (No Diff); Future    Mixed hyperlipidemia  -     Lipid Panel; Future  -     Comprehensive Metabolic Panel; Future    Old cerebrovascular accident (CVA) without late effect    Medication management  -     Vitamin D 25 Hydroxy; Future  -     Vitamin B12; Future  -     Lipid Panel; Future  -     Comprehensive Metabolic Panel; Future  -     CBC (No Diff); Future    Vitamin B 12 deficiency  -     Vitamin B12; Future    Vitamin D deficiency  -     Vitamin D 25 Hydroxy; Future    Other orders  -     atenolol (TENORMIN) 50 MG tablet; Take 0.5 tablets by mouth 2 (Two) Times a Day.  -     clopidogrel (PLAVIX) 75 MG tablet; Take 1 tablet by mouth Daily.      Mirna presents to the office today without cardiac concerns or symptoms voiced.  Her blood pressure, heart rate, and heart rhythm today are normal.  Same dose of atenolol advised and refills given.  She continues Plavix therapy without signs of bleeding or increased bruising.  Refills given.    She manages cholesterol with diet and fish oil.  A lab order given as noted above which includes lipid panel.  She admits to some issues with muscle cramps.  In the past she has had vitamin B12 and vitamin D deficiencies.  We will recheck levels per lab.  Further recommendations based on lab results.    Patient's Body mass index is 28.28 kg/m². BMI is above normal parameters. Recommendations include: nutrition counseling.  Mediterranean diet encouraged.     A 6-month follow-up visit scheduled.  Please call sooner for any cardiac concerns.           Electronically signed by AMARILIS Mcgill,  July 10, 2019 1:03 PM

## 2019-07-11 LAB
25(OH)D3 SERPL-MCNC: 50.9 NG/ML (ref 30–100)
VIT B12 BLD-MCNC: 1079 PG/ML (ref 211–946)

## 2019-07-24 ENCOUNTER — TELEPHONE (OUTPATIENT)
Dept: CARDIOLOGY | Facility: CLINIC | Age: 79
End: 2019-07-24

## 2019-07-24 DIAGNOSIS — Z79.899 MEDICATION MANAGEMENT: Primary | ICD-10-CM

## 2019-07-29 ENCOUNTER — LAB (OUTPATIENT)
Dept: LAB | Facility: HOSPITAL | Age: 79
End: 2019-07-29

## 2019-07-29 DIAGNOSIS — Z79.899 MEDICATION MANAGEMENT: ICD-10-CM

## 2019-07-29 LAB
ANION GAP SERPL CALCULATED.3IONS-SCNC: 13.3 MMOL/L (ref 5–15)
BUN BLD-MCNC: 16 MG/DL (ref 8–23)
BUN/CREAT SERPL: 14.4 (ref 7–25)
CALCIUM SPEC-SCNC: 10.2 MG/DL (ref 8.6–10.5)
CHLORIDE SERPL-SCNC: 101 MMOL/L (ref 98–107)
CO2 SERPL-SCNC: 23.7 MMOL/L (ref 22–29)
CREAT BLD-MCNC: 1.11 MG/DL (ref 0.57–1)
GFR SERPL CREATININE-BSD FRML MDRD: 47 ML/MIN/1.73
GLUCOSE BLD-MCNC: 115 MG/DL (ref 65–99)
POTASSIUM BLD-SCNC: 4.4 MMOL/L (ref 3.5–5.2)
SODIUM BLD-SCNC: 138 MMOL/L (ref 136–145)

## 2019-07-29 PROCEDURE — 36415 COLL VENOUS BLD VENIPUNCTURE: CPT

## 2019-07-29 PROCEDURE — 80048 BASIC METABOLIC PNL TOTAL CA: CPT | Performed by: NURSE PRACTITIONER

## 2019-08-21 ENCOUNTER — TELEPHONE (OUTPATIENT)
Dept: CARDIOLOGY | Facility: CLINIC | Age: 79
End: 2019-08-21

## 2019-08-21 DIAGNOSIS — Z86.718 HISTORY OF DVT OF LOWER EXTREMITY: Primary | ICD-10-CM

## 2019-08-21 DIAGNOSIS — M79.652 ACUTE THIGH PAIN, LEFT: ICD-10-CM

## 2019-08-21 NOTE — TELEPHONE ENCOUNTER
Patient called and said she has been having leg pain/cramp to upper back left leg and into groin . Started last night and is some better but is sore today. She has a history of DVT Left popliteal and left groin in  2009. Continues to take Plavix 75 mg daily and has taken today. States it is bearable but she is concerned  D/t history .

## 2019-08-26 ENCOUNTER — TELEPHONE (OUTPATIENT)
Dept: CARDIOLOGY | Facility: CLINIC | Age: 79
End: 2019-08-26

## 2019-11-25 RX ORDER — ATENOLOL 50 MG/1
TABLET ORAL
Qty: 90 TABLET | Refills: 1 | OUTPATIENT
Start: 2019-11-25

## 2020-01-09 ENCOUNTER — OFFICE VISIT (OUTPATIENT)
Dept: CARDIOLOGY | Facility: CLINIC | Age: 80
End: 2020-01-09

## 2020-01-09 ENCOUNTER — LAB (OUTPATIENT)
Dept: LAB | Facility: HOSPITAL | Age: 80
End: 2020-01-09

## 2020-01-09 VITALS
SYSTOLIC BLOOD PRESSURE: 114 MMHG | HEIGHT: 59 IN | DIASTOLIC BLOOD PRESSURE: 72 MMHG | WEIGHT: 143 LBS | HEART RATE: 68 BPM | BODY MASS INDEX: 28.83 KG/M2

## 2020-01-09 DIAGNOSIS — M25.512 ACUTE PAIN OF LEFT SHOULDER: ICD-10-CM

## 2020-01-09 DIAGNOSIS — I10 ESSENTIAL HYPERTENSION: Primary | ICD-10-CM

## 2020-01-09 DIAGNOSIS — E53.8 LOW VITAMIN B12 LEVEL: ICD-10-CM

## 2020-01-09 DIAGNOSIS — E78.5 HYPERLIPIDEMIA LDL GOAL <100: ICD-10-CM

## 2020-01-09 DIAGNOSIS — I10 ESSENTIAL HYPERTENSION: ICD-10-CM

## 2020-01-09 DIAGNOSIS — E66.3 OVERWEIGHT: ICD-10-CM

## 2020-01-09 DIAGNOSIS — R01.1 HEART MURMUR: ICD-10-CM

## 2020-01-09 DIAGNOSIS — E87.5 HYPERKALEMIA: ICD-10-CM

## 2020-01-09 LAB
ALBUMIN SERPL-MCNC: 4.02 G/DL (ref 3.5–5.2)
ALBUMIN/GLOB SERPL: 1.1 G/DL
ALP SERPL-CCNC: 78 U/L (ref 39–117)
ALT SERPL W P-5'-P-CCNC: 11 U/L (ref 1–33)
ANION GAP SERPL CALCULATED.3IONS-SCNC: 14.6 MMOL/L (ref 5–15)
AST SERPL-CCNC: 18 U/L (ref 1–32)
BILIRUB SERPL-MCNC: 0.5 MG/DL (ref 0.2–1.2)
BUN BLD-MCNC: 12 MG/DL (ref 8–23)
BUN/CREAT SERPL: 11.3 (ref 7–25)
CALCIUM SPEC-SCNC: 10.3 MG/DL (ref 8.6–10.5)
CHLORIDE SERPL-SCNC: 100 MMOL/L (ref 98–107)
CHOLEST SERPL-MCNC: 214 MG/DL (ref 0–200)
CO2 SERPL-SCNC: 23.4 MMOL/L (ref 22–29)
CREAT BLD-MCNC: 1.06 MG/DL (ref 0.57–1)
DEPRECATED RDW RBC AUTO: 50.8 FL (ref 37–54)
ERYTHROCYTE [DISTWIDTH] IN BLOOD BY AUTOMATED COUNT: 14.6 % (ref 12.3–15.4)
GFR SERPL CREATININE-BSD FRML MDRD: 50 ML/MIN/1.73
GLOBULIN UR ELPH-MCNC: 3.7 GM/DL
GLUCOSE BLD-MCNC: 102 MG/DL (ref 65–99)
HCT VFR BLD AUTO: 46.2 % (ref 34–46.6)
HDLC SERPL-MCNC: 58 MG/DL (ref 40–60)
HGB BLD-MCNC: 13.9 G/DL (ref 12–15.9)
LDLC SERPL CALC-MCNC: 122 MG/DL (ref 0–100)
LDLC/HDLC SERPL: 2.11 {RATIO}
MCH RBC QN AUTO: 28.4 PG (ref 26.6–33)
MCHC RBC AUTO-ENTMCNC: 30.1 G/DL (ref 31.5–35.7)
MCV RBC AUTO: 94.3 FL (ref 79–97)
PLATELET # BLD AUTO: 219 10*3/MM3 (ref 140–450)
PMV BLD AUTO: 11.3 FL (ref 6–12)
POTASSIUM BLD-SCNC: 4.9 MMOL/L (ref 3.5–5.2)
PROT SERPL-MCNC: 7.7 G/DL (ref 6–8.5)
RBC # BLD AUTO: 4.9 10*6/MM3 (ref 3.77–5.28)
SODIUM BLD-SCNC: 138 MMOL/L (ref 136–145)
TRIGL SERPL-MCNC: 168 MG/DL (ref 0–150)
TSH SERPL DL<=0.05 MIU/L-ACNC: 3.01 UIU/ML (ref 0.27–4.2)
VLDLC SERPL-MCNC: 33.6 MG/DL
WBC NRBC COR # BLD: 6.5 10*3/MM3 (ref 3.4–10.8)

## 2020-01-09 PROCEDURE — 80061 LIPID PANEL: CPT | Performed by: NURSE PRACTITIONER

## 2020-01-09 PROCEDURE — 84443 ASSAY THYROID STIM HORMONE: CPT | Performed by: NURSE PRACTITIONER

## 2020-01-09 PROCEDURE — 80053 COMPREHEN METABOLIC PANEL: CPT | Performed by: NURSE PRACTITIONER

## 2020-01-09 PROCEDURE — 85027 COMPLETE CBC AUTOMATED: CPT | Performed by: NURSE PRACTITIONER

## 2020-01-09 PROCEDURE — 99214 OFFICE O/P EST MOD 30 MIN: CPT | Performed by: NURSE PRACTITIONER

## 2020-01-09 PROCEDURE — 36415 COLL VENOUS BLD VENIPUNCTURE: CPT

## 2020-01-09 PROCEDURE — 82607 VITAMIN B-12: CPT | Performed by: NURSE PRACTITIONER

## 2020-01-09 RX ORDER — CLOPIDOGREL BISULFATE 75 MG/1
75 TABLET ORAL DAILY
Qty: 90 TABLET | Refills: 2 | Status: SHIPPED | OUTPATIENT
Start: 2020-01-09 | End: 2020-09-15 | Stop reason: SDUPTHER

## 2020-01-09 RX ORDER — ATENOLOL 50 MG/1
25 TABLET ORAL 2 TIMES DAILY
Qty: 90 TABLET | Refills: 2 | Status: SHIPPED | OUTPATIENT
Start: 2020-01-09 | End: 2020-09-29 | Stop reason: SDUPTHER

## 2020-01-09 NOTE — PROGRESS NOTES
Chief Complaint   Patient presents with   • Follow-up     For cardiac management. Patient is not on aspirin. Reports that she broke humerus in November, states that it is really bothering her today. Last lab work was done on 07/10/19 per Theodora, in chart under labs. States that she has been fasting.    • Med Refill     Needs refills on cardiac medications. 90 day supplies to [facility].        Cardiac Complaints  none      Subjective   Mirna Bravo is a 79 y.o. female with hypertension, hypercholesterolemia, and mitral regurgitation who had a stroke in 2013 involving the right occipital lobe. Carotid US and echo were unremarkable. Cholesterol was elevated at that time but has been unable to tolerate statin therapy. She has been managed medically and has done very well. Her last echocardiogram in 2017 showed normal LVEF and mild to mod MR.  She then called in August 2019 with pain and swelling in the left leg, it was not completed.    She returns today for follow up and denies cardiac concerns. Chest pain, shortness of breath, dizziness, and palpitations denied. She does admit to a great deal of pain in her left humerus since she broke it in November.  She went to rehab and did very well but she states now the pain has returned and she thinks it is muscular in nature. The cold causes pain.  We discussed US this summer and she states she did not complete because she did not know about it and does not remember having problems??, despite the telephone encounter. Labs from July 2019 show: VIT D 50, H/H 14.4/46.5, , BUN 22, Creatinine 1.24, K 5.5, Na 141, Ca 10.9, HDL 67, , B12 1079.  Refills of cardiac meds requested for 90 day supply.        Cardiac History  Past Surgical History:   Procedure Laterality Date   • CARDIOVASCULAR STRESS TEST  06/06/2007    4 min,16 secs., 80% THR, Negative.   • ECHO - CONVERTED  06/06/2007    EF >60%, mild to moderate MR.   • ECHO - CONVERTED  07/01/2009    EF 50-55%,  redundant mitral valve.   • ECHO - CONVERTED  03/07/2013    [facility]- EF 65%. Septal WMA.   • ECHO - CONVERTED  07/06/2017    EF 55-60%, mild to mod MR    • MRI INTRAOPERATIVE BRAIN W/ OR W/O CONTRAST  03/06/2013    Subacute Stroke in R Occipital Lobe.   • US CAROTID UNILATERAL  03/07/2013    Normal       Current Outpatient Medications   Medication Sig Dispense Refill   • acetaminophen (TYLENOL) 325 MG tablet Take 650 mg by mouth every 6 (six) hours as needed for mild pain (1-3).     • atenolol (TENORMIN) 50 MG tablet Take 0.5 tablets by mouth 2 (Two) Times a Day. 90 tablet 2   • Calcium Citrate-Vitamin D (CALCIUM CITRATE + D PO) Take  by mouth Daily.     • clopidogrel (PLAVIX) 75 MG tablet Take 1 tablet by mouth Daily. 90 tablet 2   • GARLIC PO Take  by mouth 2 (Two) Times a Day.     • Ibuprofen (ADVIL PO) Take 1 tablet by mouth Daily.     • Omega-3 Fatty Acids (FISH OIL) 1200 MG capsule delayed-release Take  by mouth 3 (three) times a day.     • vitamin B-12 (CYANOCOBALAMIN) 1000 MCG tablet Take 1,000 mcg by mouth daily.       No current facility-administered medications for this visit.        Codeine; Levaquin [levofloxacin]; Niaspan [niacin er]; Penicillins; Pravastatin; Vytorin [ezetimibe-simvastatin]; and Zocor [simvastatin]    Past Medical History:   Diagnosis Date   • Broken humerus    • CVA (cerebral infarction)    • DVT of proximal leg (deep vein thrombosis) (CMS/HCC)     Left leg   • History of bilateral cataract extraction    • History of kyphoplasty    • Hyperlipidemia    • Hypertension    • MVP (mitral valve prolapse)    • Osteopenia    • Osteoporosis    • PUD (peptic ulcer disease)        Social History     Socioeconomic History   • Marital status:      Spouse name: Not on file   • Number of children: Not on file   • Years of education: Not on file   • Highest education level: Not on file   Tobacco Use   • Smoking status: Never Smoker   • Smokeless tobacco: Never Used   Substance and Sexual  "Activity   • Alcohol use: No   • Drug use: No       Family History   Problem Relation Age of Onset   • Stroke Mother    • Hypertension Mother    • Diabetes Mother    • Hypertension Other    • Hyperlipidemia Other    • Diabetes Other    • Hypertension Child    • Diabetes Child        Review of Systems   Constitution: Negative for malaise/fatigue and night sweats.   Cardiovascular: Negative for chest pain, claudication, dyspnea on exertion, irregular heartbeat, leg swelling, near-syncope, orthopnea, palpitations and syncope.   Respiratory: Negative for cough, shortness of breath and wheezing.    Musculoskeletal: Positive for joint pain and stiffness.   Gastrointestinal: Negative for anorexia, heartburn, nausea and vomiting.   Genitourinary: Negative for dysuria, hematuria and nocturia.   Neurological: Negative for dizziness, light-headedness and loss of balance.   Psychiatric/Behavioral: Negative for depression and memory loss. The patient is not nervous/anxious.            Objective     /72 (BP Location: Right arm)   Pulse 68   Ht 149.9 cm (59.02\")   Wt 64.9 kg (143 lb)   BMI 28.87 kg/m²     Physical Exam   Constitutional: She is oriented to person, place, and time. She appears well-developed and well-nourished.   HENT:   Head: Normocephalic and atraumatic.   Eyes: Pupils are equal, round, and reactive to light. EOM are normal.   Neck: Normal range of motion. Neck supple.   Cardiovascular: Normal rate and regular rhythm.   Murmur heard.  Pulmonary/Chest: Effort normal and breath sounds normal.   Abdominal: Soft.   Musculoskeletal: Normal range of motion. She exhibits tenderness.   Neurological: She is alert and oriented to person, place, and time.   Skin: Skin is warm and dry.   Psychiatric: She has a normal mood and affect. Her behavior is normal.       Procedures    Assessment/Plan     HTN:  Well managed on current tenormin therapy.  No adjustment to current will be recommended. Limited sodium intake " advised.    Murmur/Mitral regurgitation:  Murmur no louder than prior.  Cardiac symptoms denied.  Patient refuses further echos at present as she remains asymptomatic.    Hyperlipidemia:  Patient controlling cholesterol with diet alone. Most recent LDL slightly over 100 but HDL high at 67. Since ratio is good, patient will remain controlled with diet alone, no statin added.     Hx of low vitamin B12:  Last labs showed B12 elevated.  If continues to be high, Dose of B12 will be lowered.    Hyperkalemia noted on most recent labs and patient was urged at that time to limit potassium intake.  Repeat labs advised and special attention to be paid to potassium.    She did not get US of left leg but seemed to be confused as to why it was ordered. She denies any issues with her legs today.  She remains on plavix and did not switch to xarelto as was discussed in telephone encounter.     BMI noted at 28.87, good cardiac diet with limited caffeine, carbs, and adequate water intake encouraged.    Refills sent per request.    Lab order provided. More recommendations to follow.    6 month follow up recommended or sooner if needed.    She continues to follow with ortho for left humerus break and muscle tear.                Problems Addressed this Visit        Cardiovascular and Mediastinum    HTN (hypertension) - Primary    Relevant Medications    atenolol (TENORMIN) 50 MG tablet    Other Relevant Orders    CBC (No Diff) (Completed)    Comprehensive Metabolic Panel (Completed)    TSH (Completed)    Lipid Panel (Completed)      Other Visit Diagnoses     Hyperlipidemia LDL goal <100        Relevant Orders    CBC (No Diff) (Completed)    Comprehensive Metabolic Panel (Completed)    TSH (Completed)    Lipid Panel (Completed)    Heart murmur        Low vitamin B12 level        Relevant Orders    Vitamin B12 (Completed)    Hyperkalemia        Overweight        Acute pain of left shoulder              Patient's Body mass index is 28.87  kg/m². BMI is above normal parameters. Recommendations include: nutrition counseling.              Electronically signed by AMARILIS Grover January 10, 2020 12:01 PM

## 2020-01-10 LAB — VIT B12 BLD-MCNC: 1350 PG/ML (ref 211–946)

## 2020-07-27 ENCOUNTER — TELEPHONE (OUTPATIENT)
Dept: CARDIOLOGY | Facility: CLINIC | Age: 80
End: 2020-07-27

## 2020-07-27 NOTE — TELEPHONE ENCOUNTER
Received called from Mayra with Dr. Basil Ortiz's office for cardiac clearance for patient to have kyphoplasty due to a compression fracture. Patient is on Plavix and they are needing to hold for 5 days. Patient is supposed to have surgery on Saturday. According to our records, I do not see where patient has had any stenting. Patient does have a history of CVA.       Fax [phone]    Mayra's cell phone number [phone]

## 2020-09-15 ENCOUNTER — TELEPHONE (OUTPATIENT)
Dept: CARDIOLOGY | Facility: CLINIC | Age: 80
End: 2020-09-15

## 2020-09-15 RX ORDER — CLOPIDOGREL BISULFATE 75 MG/1
75 TABLET ORAL DAILY
Qty: 90 TABLET | Refills: 3 | Status: SHIPPED | OUTPATIENT
Start: 2020-09-15 | End: 2020-09-29 | Stop reason: SDUPTHER

## 2020-09-15 NOTE — TELEPHONE ENCOUNTER
Dianna with [facility] called asking for refills on Plavix 75 mg daily. 90 tablets and 3 refills sent.

## 2020-09-29 ENCOUNTER — OFFICE VISIT (OUTPATIENT)
Dept: CARDIOLOGY | Facility: CLINIC | Age: 80
End: 2020-09-29

## 2020-09-29 ENCOUNTER — LAB (OUTPATIENT)
Dept: LAB | Facility: HOSPITAL | Age: 80
End: 2020-09-29

## 2020-09-29 VITALS
WEIGHT: 139 LBS | BODY MASS INDEX: 28.02 KG/M2 | SYSTOLIC BLOOD PRESSURE: 110 MMHG | DIASTOLIC BLOOD PRESSURE: 72 MMHG | HEART RATE: 64 BPM | TEMPERATURE: 97.1 F | HEIGHT: 59 IN

## 2020-09-29 DIAGNOSIS — E53.8 VITAMIN B12 DEFICIENCY: ICD-10-CM

## 2020-09-29 DIAGNOSIS — Z86.73 OLD CEREBROVASCULAR ACCIDENT (CVA) WITHOUT LATE EFFECT: ICD-10-CM

## 2020-09-29 DIAGNOSIS — E78.2 MIXED HYPERLIPIDEMIA: ICD-10-CM

## 2020-09-29 DIAGNOSIS — E55.9 VITAMIN D DEFICIENCY: ICD-10-CM

## 2020-09-29 DIAGNOSIS — Z79.899 MEDICATION MANAGEMENT: ICD-10-CM

## 2020-09-29 DIAGNOSIS — I10 ESSENTIAL HYPERTENSION: Primary | ICD-10-CM

## 2020-09-29 DIAGNOSIS — I10 ESSENTIAL HYPERTENSION: ICD-10-CM

## 2020-09-29 LAB
ALBUMIN SERPL-MCNC: 4.36 G/DL (ref 3.5–5.2)
ALBUMIN/GLOB SERPL: 1.4 G/DL
ALP SERPL-CCNC: 84 U/L (ref 39–117)
ALT SERPL W P-5'-P-CCNC: 12 U/L (ref 1–33)
ANION GAP SERPL CALCULATED.3IONS-SCNC: 13.8 MMOL/L (ref 5–15)
AST SERPL-CCNC: 18 U/L (ref 1–32)
BILIRUB SERPL-MCNC: 0.9 MG/DL (ref 0–1.2)
BUN SERPL-MCNC: 19 MG/DL (ref 8–23)
BUN/CREAT SERPL: 16.5 (ref 7–25)
CALCIUM SPEC-SCNC: 10.7 MG/DL (ref 8.6–10.5)
CHLORIDE SERPL-SCNC: 101 MMOL/L (ref 98–107)
CHOLEST SERPL-MCNC: 216 MG/DL (ref 0–200)
CO2 SERPL-SCNC: 25.2 MMOL/L (ref 22–29)
CREAT SERPL-MCNC: 1.15 MG/DL (ref 0.57–1)
DEPRECATED RDW RBC AUTO: 50.6 FL (ref 37–54)
ERYTHROCYTE [DISTWIDTH] IN BLOOD BY AUTOMATED COUNT: 14.5 % (ref 12.3–15.4)
GFR SERPL CREATININE-BSD FRML MDRD: 45 ML/MIN/1.73
GLOBULIN UR ELPH-MCNC: 3.1 GM/DL
GLUCOSE SERPL-MCNC: 125 MG/DL (ref 65–99)
HCT VFR BLD AUTO: 46.8 % (ref 34–46.6)
HDLC SERPL-MCNC: 68 MG/DL (ref 40–60)
HGB BLD-MCNC: 14.2 G/DL (ref 12–15.9)
LDLC SERPL CALC-MCNC: 117 MG/DL (ref 0–100)
LDLC/HDLC SERPL: 1.72 {RATIO}
MCH RBC QN AUTO: 28.9 PG (ref 26.6–33)
MCHC RBC AUTO-ENTMCNC: 30.3 G/DL (ref 31.5–35.7)
MCV RBC AUTO: 95.1 FL (ref 79–97)
PLATELET # BLD AUTO: 224 10*3/MM3 (ref 140–450)
PMV BLD AUTO: 11.3 FL (ref 6–12)
POTASSIUM SERPL-SCNC: 5.1 MMOL/L (ref 3.5–5.2)
PROT SERPL-MCNC: 7.5 G/DL (ref 6–8.5)
RBC # BLD AUTO: 4.92 10*6/MM3 (ref 3.77–5.28)
SODIUM SERPL-SCNC: 140 MMOL/L (ref 136–145)
TRIGL SERPL-MCNC: 154 MG/DL (ref 0–150)
VLDLC SERPL-MCNC: 30.8 MG/DL
WBC # BLD AUTO: 8.43 10*3/MM3 (ref 3.4–10.8)

## 2020-09-29 PROCEDURE — 36415 COLL VENOUS BLD VENIPUNCTURE: CPT

## 2020-09-29 PROCEDURE — 99213 OFFICE O/P EST LOW 20 MIN: CPT | Performed by: NURSE PRACTITIONER

## 2020-09-29 PROCEDURE — 80061 LIPID PANEL: CPT | Performed by: NURSE PRACTITIONER

## 2020-09-29 PROCEDURE — 85027 COMPLETE CBC AUTOMATED: CPT | Performed by: NURSE PRACTITIONER

## 2020-09-29 PROCEDURE — 80053 COMPREHEN METABOLIC PANEL: CPT | Performed by: NURSE PRACTITIONER

## 2020-09-29 PROCEDURE — 82607 VITAMIN B-12: CPT | Performed by: NURSE PRACTITIONER

## 2020-09-29 PROCEDURE — 82306 VITAMIN D 25 HYDROXY: CPT | Performed by: NURSE PRACTITIONER

## 2020-09-29 RX ORDER — ATENOLOL 50 MG/1
25 TABLET ORAL 2 TIMES DAILY
Qty: 90 TABLET | Refills: 2 | Status: SHIPPED | OUTPATIENT
Start: 2020-09-29 | End: 2021-06-30 | Stop reason: SDUPTHER

## 2020-09-29 RX ORDER — FAMOTIDINE 20 MG/1
20 TABLET, FILM COATED ORAL 2 TIMES DAILY
COMMUNITY

## 2020-09-29 RX ORDER — CLOPIDOGREL BISULFATE 75 MG/1
75 TABLET ORAL DAILY
Qty: 90 TABLET | Refills: 3 | Status: SHIPPED | OUTPATIENT
Start: 2020-09-29 | End: 2021-06-30 | Stop reason: SDUPTHER

## 2020-09-29 NOTE — PROGRESS NOTES
Chief Complaint   Patient presents with   • Follow-up     Cardiac management . Has copy of recent labs , with some elevated numbers. SHe has had labs and chest xray at [facility] prior to Kypholplasty 9-2020   • Med Refill     Needs refills on Atenelol 90  day supply to [facility].       Subjective       Mirna Bravo is a 80 y.o. female with hypertension, hypercholesterolemia, and mitral regurgitation who had a stroke in 2013 involving the right occipital lobe. Carotid US and echo were unremarkable. Cholesterol was elevated at that time but has been unable to tolerate statin therapy. She has been managed medically and has done very well. Her echocardiogram in 2017 showed normal LVEF and mild to mod MR.  in July 2020 she was given cardiac clearance for back surgery.    Today she comes to the office for follow-up visit.  She continues to have some back pain but better after surgery.  From a cardiac standpoint she denies symptoms or concerns.  Her blood pressure has been controlled.  No recent change in cardiac medications noted.  Recent labs did show increase potassium and low sodium but some improvement from prior lab.  She has been trying to decrease potassium intake in her diet.       Cardiac History:    Past Surgical History:   Procedure Laterality Date   • CARDIOVASCULAR STRESS TEST  06/06/2007    4 min,16 secs., 80% THR, Negative.   • ECHO - CONVERTED  06/06/2007    EF >60%, mild to moderate MR.   • ECHO - CONVERTED  07/01/2009    EF 50-55%, redundant mitral valve.   • ECHO - CONVERTED  03/07/2013    [facility]- EF 65%. Septal WMA.   • ECHO - CONVERTED  07/06/2017    EF 55-60%, mild to mod MR    • MRI INTRAOPERATIVE BRAIN W/ OR W/O CONTRAST  03/06/2013    Subacute Stroke in R Occipital Lobe.   • US CAROTID UNILATERAL  03/07/2013    Normal       Current Outpatient Medications   Medication Sig Dispense Refill   • acetaminophen (TYLENOL) 325 MG tablet Take 650 mg by mouth every 6 (six) hours as needed for mild pain  (1-3).     • atenolol (TENORMIN) 50 MG tablet Take 0.5 tablets by mouth 2 (Two) Times a Day. 90 tablet 2   • Calcium Citrate-Vitamin D (CALCIUM CITRATE + D PO) Take  by mouth Daily.     • clopidogrel (PLAVIX) 75 MG tablet Take 1 tablet by mouth Daily. 90 tablet 3   • famotidine (PEPCID) 20 MG tablet Take 20 mg by mouth As Needed for Heartburn.     • GARLIC PO Take  by mouth 2 (Two) Times a Day.     • Ibuprofen (ADVIL PO) Take 1 tablet by mouth Daily.     • Omega-3 Fatty Acids (FISH OIL) 1200 MG capsule delayed-release Take  by mouth 3 (three) times a day.     • vitamin B-12 (CYANOCOBALAMIN) 1000 MCG tablet Take 1,000 mcg by mouth daily.       No current facility-administered medications for this visit.        Codeine, Levaquin [levofloxacin], Niaspan [niacin er], Penicillins, Pravastatin, Vytorin [ezetimibe-simvastatin], and Zocor [simvastatin]    Past Medical History:   Diagnosis Date   • Broken humerus    • CVA (cerebral infarction)    • DVT of proximal leg (deep vein thrombosis) (CMS/HCC)     Left leg   • History of bilateral cataract extraction    • History of kyphoplasty    • Hyperlipidemia    • Hypertension    • MVP (mitral valve prolapse)    • Osteopenia    • Osteoporosis    • PUD (peptic ulcer disease)        Social History     Socioeconomic History   • Marital status:      Spouse name: Not on file   • Number of children: Not on file   • Years of education: Not on file   • Highest education level: Not on file   Tobacco Use   • Smoking status: Never Smoker   • Smokeless tobacco: Never Used   Substance and Sexual Activity   • Alcohol use: No   • Drug use: No       Family History   Problem Relation Age of Onset   • Stroke Mother    • Hypertension Mother    • Diabetes Mother    • Hypertension Other    • Hyperlipidemia Other    • Diabetes Other    • Hypertension Child    • Diabetes Child        Review of Systems   Constitution: Negative for decreased appetite, diaphoresis and malaise/fatigue.   HENT:  "Negative for nosebleeds.    Eyes: Negative for blurred vision.   Cardiovascular: Negative for chest pain, claudication, cyanosis, dyspnea on exertion, irregular heartbeat, leg swelling, near-syncope, orthopnea, palpitations, paroxysmal nocturnal dyspnea and syncope.   Respiratory: Negative for shortness of breath.    Endocrine: Negative for cold intolerance and heat intolerance.   Hematologic/Lymphatic: Does not bruise/bleed easily.   Skin: Negative for rash.   Musculoskeletal: Positive for arthritis and back pain. Negative for myalgias.   Gastrointestinal: Negative for heartburn, melena and nausea.   Genitourinary: Negative for dysuria and hematuria.   Neurological: Negative for dizziness and light-headedness.   Psychiatric/Behavioral: Negative for memory loss. The patient is nervous/anxious. The patient does not have insomnia.         Objective      7/27/2020: Sodium 133, potassium 5.6, chloride 97,, dioxide 33, glucose 127, BUN 26, creatinine 1.2, calcium 9.8    Labs 1/9/2020: Glucose 102, BUN 12, creatinine 1.06, sodium 138, potassium 4.9, chloride 100, CO2 23.4, calcium 10.3, total protein 7.7, albumin 4.02, ALT 11, AST 18, ALP 78, total bili 0.5, GFR 50 TSH 3.01, total cholesterol 214, triglycerides 168, HDL 58, , RBC 4.9, hemoglobin 13.9, hematocrit 46.2, platelets 219     July 2019: VIT D 50, H/H 14.4/46.5, , BUN 22, Creatinine 1.24, K 5.5, Na 141, Ca 10.9, HDL 67, , B12 1079    /72 (BP Location: Left arm)   Pulse 64   Temp 97.1 °F (36.2 °C)   Ht 149.9 cm (59.02\")   Wt 63 kg (139 lb)   BMI 28.06 kg/m²     Eyes:      Pupils: Pupils are equal, round, and reactive to light.   HENT:      Head: Normocephalic.   Neck:      Musculoskeletal: Normal range of motion.      Vascular: No carotid bruit.   Pulmonary:      Breath sounds: Normal breath sounds.   Cardiovascular:      Normal rate. Regular rhythm.   Pulses:     Intact distal pulses.   Edema:     Peripheral edema absent.   "   Abdominal:      General: Bowel sounds are normal.      Palpations: Abdomen is soft.   Musculoskeletal: Normal range of motion.   Skin:     General: Skin is warm.   Neurological:      Mental Status: Alert and oriented to person, place, and time.          Procedures: none today       Problem List Items Addressed This Visit        Cardiovascular and Mediastinum    HTN (hypertension) - Primary    Relevant Medications    atenolol (TENORMIN) 50 MG tablet    Other Relevant Orders    CBC (No Diff)    Mixed hyperlipidemia    Relevant Orders    Lipid Panel       Other    Old cerebrovascular accident (CVA) without late effect      Other Visit Diagnoses     Medication management        Relevant Orders    Lipid Panel    CBC (No Diff)    Comprehensive Metabolic Panel    Vitamin B12    Vitamin D 25 Hydroxy    Vitamin D deficiency        Relevant Orders    Comprehensive Metabolic Panel    Vitamin D 25 Hydroxy    Vitamin B12 deficiency        Relevant Orders    Comprehensive Metabolic Panel    Vitamin B12           Hypertension-blood pressure well controlled.  Her heart rate and rhythm are normal.  Continue atenolol 25 mg twice daily.  Refills faxed to her pharmacy.    Hyperlipidemia- LDL/HDL ratio has been good.  Continue diet management.  Lab lab work ordered which includes lipid panel.    Vitamin D/vitamin B12- lab order includes vitamin levels as she has history of B12 deficiency.  Most recent labs showed elevated.  She is also on vitamin D supplements.  Further recommendations based on lab results.    Patient's Body mass index is 28.06 kg/m². BMI is above normal parameters. Recommendations include: nutrition counseling.  Heart healthy diet encouraged.    History of CVA without late effect- patient denies TIA type symptoms.    From a cardiac standpoint patient appears stable.  No repeat cardiac testing advised at this time.  Further recommendations based on lab results.  I have requested a copy of labs to also be sent to you.   A 6-month follow-up visit scheduled.  Please call sooner for cardiac concerns.           Electronically signed by AMARILIS Mcgill,  September 29, 2020 09:33 EDT

## 2020-09-30 LAB
25(OH)D3 SERPL-MCNC: 46.6 NG/ML (ref 30–100)
VIT B12 BLD-MCNC: 939 PG/ML (ref 211–946)

## 2021-05-05 ENCOUNTER — TELEPHONE (OUTPATIENT)
Dept: CARDIOLOGY | Facility: CLINIC | Age: 81
End: 2021-05-05

## 2021-05-05 NOTE — TELEPHONE ENCOUNTER
Received fax from Dr. Anjel Trinidad for cardiac clearance for patient to have a parathyroidectomy. Patient is on Plavix and they are requesting to hold. According to our records, I do not see where patient has had any stenting. Patient has a history of CVA and DVT.       Fax [phone]

## 2021-06-30 ENCOUNTER — OFFICE VISIT (OUTPATIENT)
Dept: CARDIOLOGY | Facility: CLINIC | Age: 81
End: 2021-06-30

## 2021-06-30 ENCOUNTER — LAB (OUTPATIENT)
Dept: LAB | Facility: HOSPITAL | Age: 81
End: 2021-06-30

## 2021-06-30 VITALS
HEIGHT: 59 IN | HEART RATE: 64 BPM | SYSTOLIC BLOOD PRESSURE: 104 MMHG | WEIGHT: 138 LBS | DIASTOLIC BLOOD PRESSURE: 64 MMHG | BODY MASS INDEX: 27.82 KG/M2

## 2021-06-30 DIAGNOSIS — R06.09 DOE (DYSPNEA ON EXERTION): ICD-10-CM

## 2021-06-30 DIAGNOSIS — Z79.899 MEDICATION MANAGEMENT: ICD-10-CM

## 2021-06-30 DIAGNOSIS — R73.9 HYPERGLYCEMIA: ICD-10-CM

## 2021-06-30 DIAGNOSIS — E53.9 VITAMIN B DEFICIENCY: ICD-10-CM

## 2021-06-30 DIAGNOSIS — R06.02 SHORTNESS OF BREATH: Primary | ICD-10-CM

## 2021-06-30 DIAGNOSIS — R53.83 OTHER FATIGUE: ICD-10-CM

## 2021-06-30 DIAGNOSIS — Q23.3 MR (CONGENITAL MITRAL REGURGITATION): ICD-10-CM

## 2021-06-30 DIAGNOSIS — Z86.73 OLD CEREBROVASCULAR ACCIDENT (CVA) WITHOUT LATE EFFECT: ICD-10-CM

## 2021-06-30 DIAGNOSIS — E78.2 MIXED HYPERLIPIDEMIA: ICD-10-CM

## 2021-06-30 DIAGNOSIS — I10 ESSENTIAL HYPERTENSION: ICD-10-CM

## 2021-06-30 DIAGNOSIS — K21.9 GASTROESOPHAGEAL REFLUX DISEASE, UNSPECIFIED WHETHER ESOPHAGITIS PRESENT: ICD-10-CM

## 2021-06-30 LAB
ALBUMIN SERPL-MCNC: 4.37 G/DL (ref 3.5–5.2)
ALBUMIN/GLOB SERPL: 1.3 G/DL
ALP SERPL-CCNC: 84 U/L (ref 39–117)
ALT SERPL W P-5'-P-CCNC: 11 U/L (ref 1–33)
ANION GAP SERPL CALCULATED.3IONS-SCNC: 12.8 MMOL/L (ref 5–15)
AST SERPL-CCNC: 20 U/L (ref 1–32)
BASOPHILS # BLD AUTO: 0.03 10*3/MM3 (ref 0–0.2)
BASOPHILS NFR BLD AUTO: 0.4 % (ref 0–1.5)
BILIRUB SERPL-MCNC: 0.5 MG/DL (ref 0–1.2)
BUN SERPL-MCNC: 14 MG/DL (ref 8–23)
BUN/CREAT SERPL: 12.6 (ref 7–25)
CALCIUM SPEC-SCNC: 10 MG/DL (ref 8.6–10.5)
CHLORIDE SERPL-SCNC: 106 MMOL/L (ref 98–107)
CHOLEST SERPL-MCNC: 211 MG/DL (ref 0–200)
CO2 SERPL-SCNC: 23.2 MMOL/L (ref 22–29)
CREAT SERPL-MCNC: 1.11 MG/DL (ref 0.57–1)
DEPRECATED RDW RBC AUTO: 51.3 FL (ref 37–54)
EOSINOPHIL # BLD AUTO: 0.44 10*3/MM3 (ref 0–0.4)
EOSINOPHIL NFR BLD AUTO: 5.6 % (ref 0.3–6.2)
ERYTHROCYTE [DISTWIDTH] IN BLOOD BY AUTOMATED COUNT: 14.7 % (ref 12.3–15.4)
GFR SERPL CREATININE-BSD FRML MDRD: 47 ML/MIN/1.73
GLOBULIN UR ELPH-MCNC: 3.4 GM/DL
GLUCOSE SERPL-MCNC: 125 MG/DL (ref 65–99)
HBA1C MFR BLD: 6.1 % (ref 4.8–5.6)
HCT VFR BLD AUTO: 44.9 % (ref 34–46.6)
HDLC SERPL-MCNC: 66 MG/DL (ref 40–60)
HGB BLD-MCNC: 13.9 G/DL (ref 12–15.9)
IMM GRANULOCYTES # BLD AUTO: 0.02 10*3/MM3 (ref 0–0.05)
IMM GRANULOCYTES NFR BLD AUTO: 0.3 % (ref 0–0.5)
LDLC SERPL CALC-MCNC: 121 MG/DL (ref 0–100)
LDLC/HDLC SERPL: 1.78 {RATIO}
LYMPHOCYTES # BLD AUTO: 3.3 10*3/MM3 (ref 0.7–3.1)
LYMPHOCYTES NFR BLD AUTO: 41.8 % (ref 19.6–45.3)
MCH RBC QN AUTO: 29.3 PG (ref 26.6–33)
MCHC RBC AUTO-ENTMCNC: 31 G/DL (ref 31.5–35.7)
MCV RBC AUTO: 94.7 FL (ref 79–97)
MONOCYTES # BLD AUTO: 0.56 10*3/MM3 (ref 0.1–0.9)
MONOCYTES NFR BLD AUTO: 7.1 % (ref 5–12)
NEUTROPHILS NFR BLD AUTO: 3.55 10*3/MM3 (ref 1.7–7)
NEUTROPHILS NFR BLD AUTO: 44.8 % (ref 42.7–76)
NRBC BLD AUTO-RTO: 0 /100 WBC (ref 0–0.2)
PLATELET # BLD AUTO: 243 10*3/MM3 (ref 140–450)
PMV BLD AUTO: 11.2 FL (ref 6–12)
POTASSIUM SERPL-SCNC: 5.2 MMOL/L (ref 3.5–5.2)
PROT SERPL-MCNC: 7.8 G/DL (ref 6–8.5)
RBC # BLD AUTO: 4.74 10*6/MM3 (ref 3.77–5.28)
SODIUM SERPL-SCNC: 142 MMOL/L (ref 136–145)
TRIGL SERPL-MCNC: 139 MG/DL (ref 0–150)
TSH SERPL DL<=0.05 MIU/L-ACNC: 3.03 UIU/ML (ref 0.27–4.2)
VLDLC SERPL-MCNC: 24 MG/DL (ref 5–40)
WBC # BLD AUTO: 7.9 10*3/MM3 (ref 3.4–10.8)

## 2021-06-30 PROCEDURE — 36415 COLL VENOUS BLD VENIPUNCTURE: CPT

## 2021-06-30 PROCEDURE — 82607 VITAMIN B-12: CPT

## 2021-06-30 PROCEDURE — 84443 ASSAY THYROID STIM HORMONE: CPT

## 2021-06-30 PROCEDURE — 99214 OFFICE O/P EST MOD 30 MIN: CPT | Performed by: NURSE PRACTITIONER

## 2021-06-30 PROCEDURE — 80061 LIPID PANEL: CPT

## 2021-06-30 PROCEDURE — 85025 COMPLETE CBC W/AUTO DIFF WBC: CPT

## 2021-06-30 PROCEDURE — 80053 COMPREHEN METABOLIC PANEL: CPT

## 2021-06-30 PROCEDURE — 83036 HEMOGLOBIN GLYCOSYLATED A1C: CPT

## 2021-06-30 RX ORDER — DICYCLOMINE HYDROCHLORIDE 10 MG/1
CAPSULE ORAL
Qty: 30 CAPSULE | Refills: 0 | Status: SHIPPED | OUTPATIENT
Start: 2021-06-30 | End: 2021-07-09 | Stop reason: SDUPTHER

## 2021-06-30 RX ORDER — CARBOXYMETHYLCELLULOSE SODIUM 5 MG/ML
1 SOLUTION/ DROPS OPHTHALMIC DAILY PRN
COMMUNITY
End: 2022-08-30

## 2021-06-30 RX ORDER — ATENOLOL 50 MG/1
25 TABLET ORAL 2 TIMES DAILY
Qty: 90 TABLET | Refills: 3 | Status: SHIPPED | OUTPATIENT
Start: 2021-06-30 | End: 2022-02-01 | Stop reason: SDUPTHER

## 2021-06-30 RX ORDER — CLOPIDOGREL BISULFATE 75 MG/1
75 TABLET ORAL DAILY
Qty: 90 TABLET | Refills: 3 | Status: SHIPPED | OUTPATIENT
Start: 2021-06-30 | End: 2022-02-01 | Stop reason: SDUPTHER

## 2021-06-30 NOTE — PROGRESS NOTES
Chief Complaint   Patient presents with   • Follow-up     cardiac management   • Shortness of Breath     Reports having SOA since her Parathyroidectomy .    • LABS     HAd labs done at [facility], will obtain results.   • Med Refill     Needs refills on Plavix and Atenelol 90 day supply to  [facility]       Subjective       Mirna Bravo is a 81 y.o. female with hypertension, hypercholesterolemia, and mitral regurgitation who had a stroke in 2013 involving the right occipital lobe. Carotid US and echo were unremarkable. Cholesterol was elevated at that time but she has not been able to tolerate statin therapy. Her echocardiogram in 2017 showed normal LVEF and mild to mod MR.  In July 2020 she was given cardiac clearance for back surgery which shunderwent in August without issues. In May 2021 she underwent surgery for removal of one parathyroid gland according to patient.     Today she comes to the office for a follow-up visit.  Her main concern is persistent shortness of breath that developed a few days after surgery.  According to patient she has had work-up including chest x-rays, barium swallow, and CT scans of abdomen.  Apparently she has had significant reflux and is taking Pepcid twice daily.  She admits to having to change her diet to more soft foods and liquids to help manage the reflux.  Her upper abdomen feels bloated most often.  No signs of GI bleeding noted.  From a cardiac standpoint she denies symptoms of concern other than the shortness of breath.  No chest pain, palpitations, or swelling noted.  She denies recent TIA  Symptoms.       Cardiac History:    Past Surgical History:   Procedure Laterality Date   • CARDIOVASCULAR STRESS TEST  06/06/2007    4 min,16 secs., 80% THR, Negative.   • ECHO - CONVERTED  06/06/2007    EF >60%, mild to moderate MR.   • ECHO - CONVERTED  07/01/2009    EF 50-55%, redundant mitral valve.   • ECHO - CONVERTED  03/07/2013    [facility]- EF 65%. Septal WMA.   • ECHO - CONVERTED   07/06/2017    EF 55-60%, mild to mod MR    • MRI INTRAOPERATIVE BRAIN W/ OR W/O CONTRAST  03/06/2013    Subacute Stroke in R Occipital Lobe.   • US CAROTID UNILATERAL  03/07/2013    Normal       Current Outpatient Medications   Medication Sig Dispense Refill   • acetaminophen (TYLENOL) 325 MG tablet Take 650 mg by mouth every 6 (six) hours as needed for mild pain (1-3).     • atenolol (TENORMIN) 50 MG tablet Take 0.5 tablets by mouth 2 (Two) Times a Day. 90 tablet 3   • Calcium Citrate-Vitamin D (CALCIUM CITRATE + D PO) Take  by mouth Daily.     • carboxymethylcellulose (REFRESH PLUS) 0.5 % solution 1 drop Daily As Needed for Dry Eyes.     • clopidogrel (PLAVIX) 75 MG tablet Take 1 tablet by mouth Daily. 90 tablet 3   • famotidine (PEPCID) 20 MG tablet Take 20 mg by mouth As Needed for Heartburn.     • GARLIC PO Take  by mouth 2 (Two) Times a Day.     • Ibuprofen (ADVIL PO) Take 1 tablet by mouth Daily.     • Omega-3 Fatty Acids (FISH OIL) 1200 MG capsule delayed-release Take  by mouth 3 (three) times a day.     • vitamin B-12 (CYANOCOBALAMIN) 1000 MCG tablet Take 1,000 mcg by mouth daily.     • dicyclomine (Bentyl) 10 MG capsule Take before meals and at bedtime as needed for stomach bloating 30 capsule 0     No current facility-administered medications for this visit.       Codeine, Imdur [isosorbide nitrate], Levaquin [levofloxacin], Niaspan [niacin er], Penicillins, Pravastatin, Vytorin [ezetimibe-simvastatin], and Zocor [simvastatin]    Past Medical History:   Diagnosis Date   • Broken humerus    • CVA (cerebral infarction)    • DVT of proximal leg (deep vein thrombosis) (CMS/[facility])     Left leg   • History of bilateral cataract extraction    • History of kyphoplasty    • History of parathyroidectomy (CMS/[facility]) 05/12/2021   • Hyperlipidemia    • Hypertension    • MVP (mitral valve prolapse)    • Osteopenia    • Osteoporosis    • PUD (peptic ulcer disease)        Social History     Socioeconomic History   • Marital  status:      Spouse name: Not on file   • Number of children: Not on file   • Years of education: Not on file   • Highest education level: Not on file   Tobacco Use   • Smoking status: Never Smoker   • Smokeless tobacco: Never Used   Vaping Use   • Vaping Use: Never used   Substance and Sexual Activity   • Alcohol use: No   • Drug use: No       Family History   Problem Relation Age of Onset   • Stroke Mother    • Hypertension Mother    • Diabetes Mother    • Hypertension Other    • Hyperlipidemia Other    • Diabetes Other    • Hypertension Child    • Diabetes Child        Review of Systems   Constitutional: Positive for malaise/fatigue. Negative for decreased appetite, diaphoresis and fever.   HENT: Negative for nosebleeds.    Eyes: Negative for blurred vision.   Cardiovascular: Negative for chest pain, claudication, cyanosis, dyspnea on exertion, irregular heartbeat, leg swelling, near-syncope, orthopnea, palpitations, paroxysmal nocturnal dyspnea and syncope.   Respiratory: Positive for shortness of breath. Negative for cough, sleep disturbances due to breathing and sputum production.    Endocrine: Negative for cold intolerance and heat intolerance.   Hematologic/Lymphatic: Does not bruise/bleed easily.   Skin: Negative for rash.   Musculoskeletal: Positive for back pain, joint pain and stiffness. Negative for falls and myalgias.   Gastrointestinal: Positive for bloating and heartburn. Negative for dysphagia, melena and nausea.   Genitourinary: Negative for dysuria and hematuria.   Neurological: Negative for dizziness and light-headedness.   Psychiatric/Behavioral: Negative for memory loss. The patient is nervous/anxious. The patient does not have insomnia.         BP Readings from Last 5 Encounters:   06/30/21 104/64   09/29/20 110/72   01/09/20 114/72   07/10/19 130/68   01/08/19 110/70       Wt Readings from Last 5 Encounters:   06/30/21 62.6 kg (138 lb)   09/29/20 63 kg (139 lb)   01/09/20 64.9 kg (143  "lb)   07/10/19 63.5 kg (140 lb)   01/08/19 64 kg (141 lb)       Objective      Labs 9/29/2020: WBC 8.45, RBC 4.92, hemoglobin 14.2, hematocrit 46.8, platelets 224, total cholesterol 216, triglycerides 154, HDL 68, , glucose 125, BUN 19, creatinine 1.15, sodium 140, potassium 5.1, chloride 101, CO2 25.2, calcium 10.7, protein 7.5, albumin 4.36, ALT 12, AST 18, ALP 84, total bili 0.9, GFR 45    7/27/2020: Sodium 133, potassium 5.6, chloride 97,, dioxide 33, glucose 127, BUN 26, creatinine 1.2, calcium 9.8     Labs 1/9/2020: Glucose 102, BUN 12, creatinine 1.06, sodium 138, potassium 4.9, chloride 100, CO2 23.4, calcium 10.3, total protein 7.7, albumin 4.02, ALT 11, AST 18, ALP 78, total bili 0.5, GFR 50 TSH 3.01, total cholesterol 214, triglycerides 168, HDL 58, , RBC 4.9, hemoglobin 13.9, hematocrit 46.2, platelets 219      July 2019: VIT D 50, H/H 14.4/46.5, , BUN 22, Creatinine 1.24, K 5.5, Na 141, Ca 10.9, HDL 67, , B12 1079    /64 (BP Location: Left arm)   Pulse 64   Ht 149.9 cm (59.02\")   Wt 62.6 kg (138 lb)   BMI 27.85 kg/m²     Vitals and nursing note reviewed.   Eyes:      Pupils: Pupils are equal, round, and reactive to light.   HENT:      Head: Normocephalic.   Pulmonary:      Breath sounds: Normal breath sounds.   Cardiovascular:      Normal rate. Regular rhythm.   Pulses:     Intact distal pulses.   Edema:     Peripheral edema absent.   Abdominal:      General: Bowel sounds are normal.      Palpations: Abdomen is soft.   Musculoskeletal: Normal range of motion.      Cervical back: Normal range of motion. Skin:     General: Skin is warm.   Neurological:      Mental Status: Alert and oriented to person, place, and time.          Procedures: none today          Assessment/Plan   Diagnoses and all orders for this visit:    1. Shortness of breath (Primary)  -     Adult Transthoracic Echo Complete W/ Cont if Necessary Per Protocol; Future    2. ARREDONDO (dyspnea on exertion)  -  "    Adult Transthoracic Echo Complete W/ Cont if Necessary Per Protocol; Future  -     Comprehensive Metabolic Panel; Future  -     CBC & Differential; Future    3. 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 Essential hypertension  -     Comprehensive Metabolic Panel; Future  -     CBC & Differential; Future  -     atenolol (TENORMIN) 50 MG tablet; Take 0.5 tablets by mouth 2 (Two) Times a Day.  Dispense: 90 tablet; Refill: 3    5. Mixed hyperlipidemia  -     Lipid Panel; Future    6. Hyperglycemia  -     Hemoglobin A1c; Future    7. Old cerebrovascular accident (CVA) without late effect  -     clopidogrel (PLAVIX) 75 MG tablet; Take 1 tablet by mouth Daily.  Dispense: 90 tablet; Refill: 3    8. Gastroesophageal reflux disease, unspecified whether esophagitis present  -     dicyclomine (Bentyl) 10 MG capsule; Take before meals and at bedtime as needed for stomach bloating  Dispense: 30 capsule; Refill: 0    9. Vitamin B deficiency    10. Other fatigue  -     TSH; Future  -     Vitamin B12; Future    11. Medication management  -     Comprehensive Metabolic Panel; Future  -     CBC & Differential; Future  -     Lipid Panel; Future  -     TSH; Future  -     Vitamin B12; Future      Mirna presents today with concern over increase shortness of breath and dyspnea.  An echocardiogram ordered to reassess overall cardiac output, mitral regurgitation, and PA pressure.    Her blood pressure is low normal today.  Continue current dose of atenolol.  Monitor blood pressure and heart rate.    Hyperlipidemia-diet managed.  Lab order given to include lipid panel and LFT.    Hyperglycemia-September lab reports showed fasting glucose 125.  A1c ordered to evaluate glucose average.  I requested a copy of labs to also be sent to you.    History of CVA-continue Plavix therapy.  Refills given.  No recent TIA symptoms.    GERD-she is taking Pepcid twice a day  but denies benefit in symptoms.  Prescription for Bentyl given.  If the medication does not provide benefit from symptoms then will discontinue.  She has altered her diet to also help manage symptoms.    Patient's Body mass index is 27.85 kg/m². indicating that she is overweight (BMI 25-29.9). Obesity-related health conditions include the following: hypertension. Obesity is unchanged. BMI is is above average; BMI management plan is completed. We discussed portion control..     Further recommendations based on lab results and echocardiogram results.  A 6-month follow-up visit scheduled.  Please call sooner for cardiac concerns.           Electronically signed by AMARILIS Mcgill,  June 30, 2021 09:37 EDT

## 2021-07-01 LAB — VIT B12 BLD-MCNC: 916 PG/ML (ref 211–946)

## 2021-07-05 ENCOUNTER — HOSPITAL ENCOUNTER (OUTPATIENT)
Dept: CARDIOLOGY | Facility: HOSPITAL | Age: 81
Discharge: HOME | End: 2021-07-05
Admitting: NURSE PRACTITIONER

## 2021-07-05 DIAGNOSIS — Q23.3 MR (CONGENITAL MITRAL REGURGITATION): ICD-10-CM

## 2021-07-05 DIAGNOSIS — R06.02 SHORTNESS OF BREATH: ICD-10-CM

## 2021-07-05 DIAGNOSIS — R06.09 DOE (DYSPNEA ON EXERTION): ICD-10-CM

## 2021-07-05 LAB
BH CV ECHO MEAS - ACS: 1.8 CM
BH CV ECHO MEAS - AO MAX PG: 3.7 MMHG
BH CV ECHO MEAS - AO MEAN PG: 2 MMHG
BH CV ECHO MEAS - AO ROOT AREA (BSA CORRECTED): 2
BH CV ECHO MEAS - AO ROOT AREA: 7.5 CM^2
BH CV ECHO MEAS - AO ROOT DIAM: 3.1 CM
BH CV ECHO MEAS - AO V2 MAX: 96.1 CM/SEC
BH CV ECHO MEAS - AO V2 MEAN: 66.7 CM/SEC
BH CV ECHO MEAS - AO V2 VTI: 21.3 CM
BH CV ECHO MEAS - BSA(HAYCOCK): 1.6 M^2
BH CV ECHO MEAS - BSA: 1.6 M^2
BH CV ECHO MEAS - BZI_BMI: 27.9 KILOGRAMS/M^2
BH CV ECHO MEAS - BZI_METRIC_HEIGHT: 149.9 CM
BH CV ECHO MEAS - BZI_METRIC_WEIGHT: 62.6 KG
BH CV ECHO MEAS - EDV(CUBED): 96.7 ML
BH CV ECHO MEAS - EDV(MOD-SP4): 53.7 ML
BH CV ECHO MEAS - EDV(TEICH): 96.8 ML
BH CV ECHO MEAS - EF(CUBED): 66.7 %
BH CV ECHO MEAS - EF(MOD-SP4): 48.2 %
BH CV ECHO MEAS - EF(TEICH): 58.3 %
BH CV ECHO MEAS - EF_3D-VOL: 66 %
BH CV ECHO MEAS - ESV(CUBED): 32.2 ML
BH CV ECHO MEAS - ESV(MOD-SP4): 27.8 ML
BH CV ECHO MEAS - ESV(TEICH): 40.3 ML
BH CV ECHO MEAS - FS: 30.7 %
BH CV ECHO MEAS - IVS/LVPW: 1.1
BH CV ECHO MEAS - IVSD: 0.95 CM
BH CV ECHO MEAS - LA DIMENSION: 3.2 CM
BH CV ECHO MEAS - LA/AO: 1
BH CV ECHO MEAS - LV DIASTOLIC VOL/BSA (35-75): 34.1 ML/M^2
BH CV ECHO MEAS - LV IVRT: 0.14 SEC
BH CV ECHO MEAS - LV MASS(C)D: 141.1 GRAMS
BH CV ECHO MEAS - LV MASS(C)DI: 89.6 GRAMS/M^2
BH CV ECHO MEAS - LV SYSTOLIC VOL/BSA (12-30): 17.7 ML/M^2
BH CV ECHO MEAS - LVIDD: 4.6 CM
BH CV ECHO MEAS - LVIDS: 3.2 CM
BH CV ECHO MEAS - LVLD AP4: 5.8 CM
BH CV ECHO MEAS - LVLS AP4: 4.9 CM
BH CV ECHO MEAS - LVOT AREA (M): 2.8 CM^2
BH CV ECHO MEAS - LVOT AREA: 2.8 CM^2
BH CV ECHO MEAS - LVOT DIAM: 1.9 CM
BH CV ECHO MEAS - LVPWD: 0.89 CM
BH CV ECHO MEAS - MR MAX PG: 92.3 MMHG
BH CV ECHO MEAS - MR MAX VEL: 480 CM/SEC
BH CV ECHO MEAS - MR MEAN PG: 60 MMHG
BH CV ECHO MEAS - MR MEAN VEL: 371 CM/SEC
BH CV ECHO MEAS - MR VTI: 176 CM
BH CV ECHO MEAS - MV A MAX VEL: 76.3 CM/SEC
BH CV ECHO MEAS - MV DEC SLOPE: 337.5 CM/SEC^2
BH CV ECHO MEAS - MV E MAX VEL: 60 CM/SEC
BH CV ECHO MEAS - MV E/A: 0.79
BH CV ECHO MEAS - MV MAX PG: 3.9 MMHG
BH CV ECHO MEAS - MV MEAN PG: 2 MMHG
BH CV ECHO MEAS - MV P1/2T MAX VEL: 95 CM/SEC
BH CV ECHO MEAS - MV P1/2T: 82.4 MSEC
BH CV ECHO MEAS - MV V2 MAX: 99 CM/SEC
BH CV ECHO MEAS - MV V2 MEAN: 56.8 CM/SEC
BH CV ECHO MEAS - MV V2 VTI: 25.2 CM
BH CV ECHO MEAS - MVA P1/2T LCG: 2.3 CM^2
BH CV ECHO MEAS - MVA(P1/2T): 2.7 CM^2
BH CV ECHO MEAS - RAP SYSTOLE: 10 MMHG
BH CV ECHO MEAS - RVDD: 2.8 CM
BH CV ECHO MEAS - RVSP: 39.8 MMHG
BH CV ECHO MEAS - SI(AO): 102.1 ML/M^2
BH CV ECHO MEAS - SI(CUBED): 41 ML/M^2
BH CV ECHO MEAS - SI(MOD-SP4): 16.4 ML/M^2
BH CV ECHO MEAS - SI(TEICH): 35.9 ML/M^2
BH CV ECHO MEAS - SV(AO): 160.8 ML
BH CV ECHO MEAS - SV(CUBED): 64.5 ML
BH CV ECHO MEAS - SV(MOD-SP4): 25.9 ML
BH CV ECHO MEAS - SV(TEICH): 56.5 ML
BH CV ECHO MEAS - TR MAX VEL: 273 CM/SEC
MAXIMAL PREDICTED HEART RATE: 137 BPM
STRESS TARGET HR: 116 BPM

## 2021-07-05 PROCEDURE — 93306 TTE W/DOPPLER COMPLETE: CPT

## 2021-07-05 PROCEDURE — 93306 TTE W/DOPPLER COMPLETE: CPT | Performed by: INTERNAL MEDICINE

## 2021-07-09 DIAGNOSIS — K21.9 GASTROESOPHAGEAL REFLUX DISEASE, UNSPECIFIED WHETHER ESOPHAGITIS PRESENT: ICD-10-CM

## 2021-07-12 RX ORDER — DICYCLOMINE HYDROCHLORIDE 10 MG/1
CAPSULE ORAL
Qty: 90 CAPSULE | Refills: 3 | Status: SHIPPED | OUTPATIENT
Start: 2021-07-12 | End: 2022-02-01 | Stop reason: SDUPTHER

## 2021-07-14 ENCOUNTER — APPOINTMENT (OUTPATIENT)
Dept: CARDIOLOGY | Facility: HOSPITAL | Age: 81
End: 2021-07-14

## 2022-01-03 ENCOUNTER — TELEPHONE (OUTPATIENT)
Dept: CARDIOLOGY | Facility: CLINIC | Age: 82
End: 2022-01-03

## 2022-01-03 NOTE — TELEPHONE ENCOUNTER
Received fax from [facility] for cardiac clearance for patient to have a T10 kyphoplasty. Patient is on Plavix, unclear if needing to hold. According to our records, I do not see where patient has had any stenting.         Fax [phone]

## 2022-01-31 ENCOUNTER — LAB (OUTPATIENT)
Dept: LAB | Facility: HOSPITAL | Age: 82
End: 2022-01-31

## 2022-01-31 ENCOUNTER — OFFICE VISIT (OUTPATIENT)
Dept: CARDIOLOGY | Facility: CLINIC | Age: 82
End: 2022-01-31

## 2022-01-31 VITALS
DIASTOLIC BLOOD PRESSURE: 84 MMHG | HEIGHT: 59 IN | WEIGHT: 131.2 LBS | HEART RATE: 64 BPM | TEMPERATURE: 97.5 F | SYSTOLIC BLOOD PRESSURE: 136 MMHG | BODY MASS INDEX: 26.45 KG/M2

## 2022-01-31 DIAGNOSIS — E78.2 MIXED HYPERLIPIDEMIA: ICD-10-CM

## 2022-01-31 DIAGNOSIS — R06.02 SHORTNESS OF BREATH: ICD-10-CM

## 2022-01-31 DIAGNOSIS — K21.9 GASTROESOPHAGEAL REFLUX DISEASE, UNSPECIFIED WHETHER ESOPHAGITIS PRESENT: ICD-10-CM

## 2022-01-31 DIAGNOSIS — R53.83 OTHER FATIGUE: ICD-10-CM

## 2022-01-31 DIAGNOSIS — I10 PRIMARY HYPERTENSION: Primary | ICD-10-CM

## 2022-01-31 DIAGNOSIS — I10 PRIMARY HYPERTENSION: ICD-10-CM

## 2022-01-31 DIAGNOSIS — Z86.73 OLD CEREBROVASCULAR ACCIDENT (CVA) WITHOUT LATE EFFECT: ICD-10-CM

## 2022-01-31 LAB
ALBUMIN SERPL-MCNC: 4.28 G/DL (ref 3.5–5.2)
ALBUMIN/GLOB SERPL: 1.5 G/DL
ALP SERPL-CCNC: 95 U/L (ref 39–117)
ALT SERPL W P-5'-P-CCNC: 10 U/L (ref 1–33)
ANION GAP SERPL CALCULATED.3IONS-SCNC: 15.1 MMOL/L (ref 5–15)
AST SERPL-CCNC: 18 U/L (ref 1–32)
BASOPHILS # BLD AUTO: 0.04 10*3/MM3 (ref 0–0.2)
BASOPHILS NFR BLD AUTO: 0.5 % (ref 0–1.5)
BILIRUB SERPL-MCNC: 0.7 MG/DL (ref 0–1.2)
BUN SERPL-MCNC: 14 MG/DL (ref 8–23)
BUN/CREAT SERPL: 13.3 (ref 7–25)
CALCIUM SPEC-SCNC: 9.7 MG/DL (ref 8.6–10.5)
CHLORIDE SERPL-SCNC: 102 MMOL/L (ref 98–107)
CHOLEST SERPL-MCNC: 197 MG/DL (ref 0–200)
CO2 SERPL-SCNC: 24.9 MMOL/L (ref 22–29)
CREAT SERPL-MCNC: 1.05 MG/DL (ref 0.57–1)
DEPRECATED RDW RBC AUTO: 50.2 FL (ref 37–54)
EOSINOPHIL # BLD AUTO: 0.36 10*3/MM3 (ref 0–0.4)
EOSINOPHIL NFR BLD AUTO: 4.1 % (ref 0.3–6.2)
ERYTHROCYTE [DISTWIDTH] IN BLOOD BY AUTOMATED COUNT: 14.4 % (ref 12.3–15.4)
GFR SERPL CREATININE-BSD FRML MDRD: 50 ML/MIN/1.73
GLOBULIN UR ELPH-MCNC: 2.9 GM/DL
GLUCOSE SERPL-MCNC: 119 MG/DL (ref 65–99)
HCT VFR BLD AUTO: 45.2 % (ref 34–46.6)
HDLC SERPL-MCNC: 62 MG/DL (ref 40–60)
HGB BLD-MCNC: 14.1 G/DL (ref 12–15.9)
IMM GRANULOCYTES # BLD AUTO: 0.01 10*3/MM3 (ref 0–0.05)
IMM GRANULOCYTES NFR BLD AUTO: 0.1 % (ref 0–0.5)
LDLC SERPL CALC-MCNC: 112 MG/DL (ref 0–100)
LDLC/HDLC SERPL: 1.75 {RATIO}
LYMPHOCYTES # BLD AUTO: 3.7 10*3/MM3 (ref 0.7–3.1)
LYMPHOCYTES NFR BLD AUTO: 41.7 % (ref 19.6–45.3)
MCH RBC QN AUTO: 29.7 PG (ref 26.6–33)
MCHC RBC AUTO-ENTMCNC: 31.2 G/DL (ref 31.5–35.7)
MCV RBC AUTO: 95.2 FL (ref 79–97)
MONOCYTES # BLD AUTO: 0.58 10*3/MM3 (ref 0.1–0.9)
MONOCYTES NFR BLD AUTO: 6.5 % (ref 5–12)
NEUTROPHILS NFR BLD AUTO: 4.18 10*3/MM3 (ref 1.7–7)
NEUTROPHILS NFR BLD AUTO: 47.1 % (ref 42.7–76)
NRBC BLD AUTO-RTO: 0 /100 WBC (ref 0–0.2)
PLATELET # BLD AUTO: 274 10*3/MM3 (ref 140–450)
PMV BLD AUTO: 11.1 FL (ref 6–12)
POTASSIUM SERPL-SCNC: 5 MMOL/L (ref 3.5–5.2)
PROT SERPL-MCNC: 7.2 G/DL (ref 6–8.5)
RBC # BLD AUTO: 4.75 10*6/MM3 (ref 3.77–5.28)
SODIUM SERPL-SCNC: 142 MMOL/L (ref 136–145)
TRIGL SERPL-MCNC: 133 MG/DL (ref 0–150)
TSH SERPL DL<=0.05 MIU/L-ACNC: 3.12 UIU/ML (ref 0.27–4.2)
VLDLC SERPL-MCNC: 23 MG/DL (ref 5–40)
WBC NRBC COR # BLD: 8.87 10*3/MM3 (ref 3.4–10.8)

## 2022-01-31 PROCEDURE — 82607 VITAMIN B-12: CPT

## 2022-01-31 PROCEDURE — 85025 COMPLETE CBC W/AUTO DIFF WBC: CPT

## 2022-01-31 PROCEDURE — 36415 COLL VENOUS BLD VENIPUNCTURE: CPT

## 2022-01-31 PROCEDURE — 84443 ASSAY THYROID STIM HORMONE: CPT

## 2022-01-31 PROCEDURE — 99214 OFFICE O/P EST MOD 30 MIN: CPT | Performed by: NURSE PRACTITIONER

## 2022-01-31 PROCEDURE — 80053 COMPREHEN METABOLIC PANEL: CPT

## 2022-01-31 PROCEDURE — 80061 LIPID PANEL: CPT

## 2022-01-31 NOTE — PROGRESS NOTES
Chief Complaint   Patient presents with   • Follow-up     Cardiac management . Had surgery to pararthyroid last year. She had Kyphoplasty recently. SHe also reports she had Barium swallow and has been diagnosed with  Acid reflux   • LABS     Had preop labs  January 2022 at [facility] .   • Med Refill     No refills needed today. Had med list today.       Subjective       Mirna Bravo is a 82 y.o. female with hypertension, hypercholesterolemia, and mitral regurgitation who had a stroke in 2013 involving the right occipital lobe. Carotid US and echo were unremarkable. Cholesterol was elevated at that time but she has not been able to tolerate statin therapy. Her echocardiogram in 2017 showed normal LVEF and mild to mod MR.  In August 2020, she underwent back surgery without cardiac issues. In May 2021 she underwent surgery for removal of one parathyroid gland according to patient.     Today she comes to the office for a follow up visit.  In January she underwent kyphosis without issue.  She also had recent barium swallow and has been diagnosed with GERD.  She continues to have some symptoms but a little better since adding Pepcid.  She continues to report decreased appetite and fatigue.  No recent chest pain, palpitations, or swelling noted.  She admits to mild shortness of breath but attributes to GERD.  TIA symptoms denied.    Cardiac History:    Past Surgical History:   Procedure Laterality Date   • CARDIOVASCULAR STRESS TEST  06/06/2007    4 min,16 secs., 80% THR, Negative.   • ECHO - CONVERTED  06/06/2007    EF >60%, mild to moderate MR.   • ECHO - CONVERTED  07/01/2009    EF 50-55%, redundant mitral valve.   • ECHO - CONVERTED  03/07/2013    [facility]- EF 65%. Septal WMA.   • ECHO - CONVERTED  07/06/2017    EF 55-60%, mild to mod MR    • ECHO - CONVERTED  07/05/2021    EF 60%. MVP Mod MR. RVSP- 40 mmHg   • MRI INTRAOPERATIVE BRAIN W/ OR W/O CONTRAST  03/06/2013    Subacute Stroke in R Occipital Lobe.   • US CAROTID  UNILATERAL  03/07/2013    Normal       Current Outpatient Medications   Medication Sig Dispense Refill   • acetaminophen (TYLENOL) 325 MG tablet Take 650 mg by mouth every 6 (six) hours as needed for mild pain (1-3).     • atenolol (TENORMIN) 50 MG tablet Take 0.5 tablets by mouth 2 (Two) Times a Day. 90 tablet 3   • Calcium Citrate-Vitamin D (CALCIUM CITRATE + D PO) Take  by mouth Daily.     • carboxymethylcellulose (REFRESH PLUS) 0.5 % solution 1 drop Daily As Needed for Dry Eyes.     • clopidogrel (PLAVIX) 75 MG tablet Take 1 tablet by mouth Daily. 90 tablet 3   • dicyclomine (Bentyl) 10 MG capsule Take before meals and at bedtime as needed for stomach bloating 90 capsule 3   • famotidine (PEPCID) 20 MG tablet Take 20 mg by mouth As Needed for Heartburn.     • GARLIC PO Take  by mouth 2 (Two) Times a Day.     • Ibuprofen (ADVIL PO) Take 1 tablet by mouth Daily.     • Omega-3 Fatty Acids (FISH OIL) 1200 MG capsule delayed-release Take  by mouth 3 (three) times a day.     • vitamin B-12 (CYANOCOBALAMIN) 1000 MCG tablet Take 1,000 mcg by mouth daily.       No current facility-administered medications for this visit.       Hydrocodone, Codeine, Imdur [isosorbide nitrate], Levaquin [levofloxacin], Niaspan [niacin er], Penicillins, Pravastatin, Tramadol, Vytorin [ezetimibe-simvastatin], and Zocor [simvastatin]    Past Medical History:   Diagnosis Date   • Acid reflux    • Broken humerus    • CVA (cerebral infarction)    • DVT of proximal leg (deep vein thrombosis) (HCC)     Left leg   • History of bilateral cataract extraction    • History of kyphoplasty    • History of parathyroidectomy ([facility]) 05/12/2021   • Hyperlipidemia    • Hypertension    • MVP (mitral valve prolapse)    • Osteopenia    • Osteoporosis    • PUD (peptic ulcer disease)        Social History     Socioeconomic History   • Marital status:    Tobacco Use   • Smoking status: Never Smoker   • Smokeless tobacco: Never Used   Vaping Use   • Vaping  "Use: Never used   Substance and Sexual Activity   • Alcohol use: No   • Drug use: No       Family History   Problem Relation Age of Onset   • Stroke Mother    • Hypertension Mother    • Diabetes Mother    • Hypertension Other    • Hyperlipidemia Other    • Diabetes Other    • Hypertension Child    • Diabetes Child        Review of Systems   Constitutional: Positive for decreased appetite and malaise/fatigue. Negative for diaphoresis and fever.   HENT: Negative for nosebleeds.    Eyes: Negative for blurred vision.   Cardiovascular: Negative for chest pain, claudication, cyanosis, dyspnea on exertion, irregular heartbeat, leg swelling, near-syncope, orthopnea, palpitations, paroxysmal nocturnal dyspnea and syncope.   Respiratory: Positive for shortness of breath. Negative for cough, sleep disturbances due to breathing and snoring.    Endocrine: Negative for cold intolerance and heat intolerance.   Hematologic/Lymphatic: Negative for bleeding problem. Does not bruise/bleed easily.   Skin: Negative for rash.   Musculoskeletal: Positive for arthritis, back pain and stiffness. Negative for myalgias.   Gastrointestinal: Negative for abdominal pain, change in bowel habit, dysphagia, heartburn, melena and nausea.        Sometimes feels like a \"lump in my throat\"   Genitourinary: Negative for dysuria and hematuria.   Neurological: Negative for dizziness and light-headedness.   Psychiatric/Behavioral: Negative for memory loss. The patient does not have insomnia and is not nervous/anxious.         BP Readings from Last 5 Encounters:   01/31/22 136/84   06/30/21 104/64   09/29/20 110/72   01/09/20 114/72   07/10/19 130/68       Wt Readings from Last 5 Encounters:   01/31/22 59.5 kg (131 lb 3.2 oz)   06/30/21 62.6 kg (138 lb)   09/29/20 63 kg (139 lb)   01/09/20 64.9 kg (143 lb)   07/10/19 63.5 kg (140 lb)       Objective      Labs 6/30/2021: A1c 6.1, RBC 4.74, hemoglobin hematocrit 13.9 and 44.9, platelets 243, WBC 7.9, TSH " "3.03, glucose 125, BUN 14, creatinine 1.11, sodium 142, potassium 5.2, chloride 106, CO2 23.2, calcium 10, total protein 7.8, BUN 4.37, ALT 11, AST 20, ALP 84, total bili 0.5, GFR 47, total cholesterol 211, triglycerides 139, HDL 66, , vitamin B-12 916    /84 (BP Location: Left arm)   Pulse 64   Temp 97.5 °F (36.4 °C)   Ht 149.9 cm (59.02\")   Wt 59.5 kg (131 lb 3.2 oz)   BMI 26.48 kg/m²     Vitals and nursing note reviewed.   Eyes:      Pupils: Pupils are equal, round, and reactive to light.   HENT:      Head: Normocephalic.   Neck:      Vascular: No carotid bruit or JVD.   Pulmonary:      Breath sounds: Normal breath sounds.   Cardiovascular:      Normal rate. Regular rhythm.      Murmurs: There is a grade 2/6 systolic murmur.   Pulses:     Intact distal pulses.   Edema:     Peripheral edema absent.   Abdominal:      General: Bowel sounds are normal.      Palpations: Abdomen is soft.   Musculoskeletal:      Cervical back: Decreased range of motion (related to back problems). Skin:     General: Skin is warm.   Neurological:      Mental Status: Alert and oriented to person, place, and time.          Procedures: none today          Assessment/Plan   Diagnoses and all orders for this visit:    1. Primary hypertension (Primary)  -     Comprehensive Metabolic Panel; Future  -     CBC & Differential; Future    2. Mixed hyperlipidemia  -     Lipid Panel; Future    3. Old cerebrovascular accident (CVA) without late effect    4. Gastroesophageal reflux disease, unspecified whether esophagitis present    5. Shortness of breath  -     CBC & Differential; Future  -     TSH; Future  -     Vitamin B12; Future    6. Other fatigue  -     CBC & Differential; Future  -     TSH; Future  -     Vitamin B12; Future      Primary hypertension-BP stable.  Heart rate and rhythm normal.  Continue atenolol 25 mg twice a day.    Mixed hyperlipidemia-continue fish oil.  Lab order given to include lipid panel.  Patient is " hesitant to take lipid-lowering agents.    GERD-continues to have some symptoms but overall improved.  Continue Pepcid and Bentyl.    Shortness of breath-mild.  CBC, B12 and TSH included in lab order.    Patient's Body mass index is 26.48 kg/m². indicating that she is overweight (BMI 25-29.9). Obesity-related health conditions include the following: hypertension and dyslipidemias. Obesity is unchanged. BMI is is above average; BMI management plan is completed. We discussed portion control..     Patient denies cardiac concerns.  No repeat testing warranted at this time.  A 6-month follow-up visit scheduled.  Please call sooner if cardiac concerns.              Electronically signed by AMARILIS Mcgill,  January 31, 2022 11:50 EST

## 2022-02-01 DIAGNOSIS — Z86.73 OLD CEREBROVASCULAR ACCIDENT (CVA) WITHOUT LATE EFFECT: ICD-10-CM

## 2022-02-01 DIAGNOSIS — I10 ESSENTIAL HYPERTENSION: ICD-10-CM

## 2022-02-01 DIAGNOSIS — K21.9 GASTROESOPHAGEAL REFLUX DISEASE, UNSPECIFIED WHETHER ESOPHAGITIS PRESENT: ICD-10-CM

## 2022-02-01 LAB — VIT B12 BLD-MCNC: 1654 PG/ML (ref 211–946)

## 2022-02-01 RX ORDER — ATENOLOL 50 MG/1
25 TABLET ORAL 2 TIMES DAILY
Qty: 90 TABLET | Refills: 3 | Status: SHIPPED | OUTPATIENT
Start: 2022-02-01 | End: 2022-12-27 | Stop reason: SDUPTHER

## 2022-02-01 RX ORDER — CLOPIDOGREL BISULFATE 75 MG/1
75 TABLET ORAL DAILY
Qty: 90 TABLET | Refills: 3 | Status: SHIPPED | OUTPATIENT
Start: 2022-02-01 | End: 2022-12-27 | Stop reason: SDUPTHER

## 2022-02-01 RX ORDER — DICYCLOMINE HYDROCHLORIDE 10 MG/1
CAPSULE ORAL
Qty: 120 CAPSULE | Refills: 9 | Status: SHIPPED | OUTPATIENT
Start: 2022-02-01

## 2022-02-10 ENCOUNTER — TELEPHONE (OUTPATIENT)
Dept: CARDIOLOGY | Facility: CLINIC | Age: 82
End: 2022-02-10

## 2022-08-30 ENCOUNTER — OFFICE VISIT (OUTPATIENT)
Dept: CARDIOLOGY | Facility: CLINIC | Age: 82
End: 2022-08-30

## 2022-08-30 ENCOUNTER — LAB (OUTPATIENT)
Dept: LAB | Facility: HOSPITAL | Age: 82
End: 2022-08-30

## 2022-08-30 VITALS
SYSTOLIC BLOOD PRESSURE: 124 MMHG | HEART RATE: 70 BPM | WEIGHT: 128.2 LBS | DIASTOLIC BLOOD PRESSURE: 70 MMHG | HEIGHT: 59 IN | BODY MASS INDEX: 25.84 KG/M2

## 2022-08-30 DIAGNOSIS — R53.83 OTHER FATIGUE: ICD-10-CM

## 2022-08-30 DIAGNOSIS — Z79.899 MEDICATION MANAGEMENT: ICD-10-CM

## 2022-08-30 DIAGNOSIS — E78.2 MIXED HYPERLIPIDEMIA: ICD-10-CM

## 2022-08-30 DIAGNOSIS — I10 PRIMARY HYPERTENSION: ICD-10-CM

## 2022-08-30 DIAGNOSIS — E53.8 VITAMIN B12 DEFICIENCY: ICD-10-CM

## 2022-08-30 DIAGNOSIS — I10 PRIMARY HYPERTENSION: Primary | ICD-10-CM

## 2022-08-30 LAB
ALBUMIN SERPL-MCNC: 4.22 G/DL (ref 3.5–5.2)
ALBUMIN/GLOB SERPL: 1.4 G/DL
ALP SERPL-CCNC: 78 U/L (ref 39–117)
ALT SERPL W P-5'-P-CCNC: 10 U/L (ref 1–33)
ANION GAP SERPL CALCULATED.3IONS-SCNC: 13.4 MMOL/L (ref 5–15)
AST SERPL-CCNC: 17 U/L (ref 1–32)
BILIRUB SERPL-MCNC: 0.4 MG/DL (ref 0–1.2)
BUN SERPL-MCNC: 17 MG/DL (ref 8–23)
BUN/CREAT SERPL: 16.3 (ref 7–25)
CALCIUM SPEC-SCNC: 9.5 MG/DL (ref 8.6–10.5)
CHLORIDE SERPL-SCNC: 103 MMOL/L (ref 98–107)
CHOLEST SERPL-MCNC: 198 MG/DL (ref 0–200)
CO2 SERPL-SCNC: 24.6 MMOL/L (ref 22–29)
CREAT SERPL-MCNC: 1.04 MG/DL (ref 0.57–1)
DEPRECATED RDW RBC AUTO: 50.4 FL (ref 37–54)
EGFRCR SERPLBLD CKD-EPI 2021: 53.1 ML/MIN/1.73
ERYTHROCYTE [DISTWIDTH] IN BLOOD BY AUTOMATED COUNT: 14.6 % (ref 12.3–15.4)
GLOBULIN UR ELPH-MCNC: 3.1 GM/DL
GLUCOSE SERPL-MCNC: 112 MG/DL (ref 65–99)
HCT VFR BLD AUTO: 45 % (ref 34–46.6)
HDLC SERPL-MCNC: 65 MG/DL (ref 40–60)
HGB BLD-MCNC: 14.1 G/DL (ref 12–15.9)
LDLC SERPL CALC-MCNC: 115 MG/DL (ref 0–100)
LDLC/HDLC SERPL: 1.74 {RATIO}
MCH RBC QN AUTO: 29.3 PG (ref 26.6–33)
MCHC RBC AUTO-ENTMCNC: 31.3 G/DL (ref 31.5–35.7)
MCV RBC AUTO: 93.6 FL (ref 79–97)
PLATELET # BLD AUTO: 225 10*3/MM3 (ref 140–450)
PMV BLD AUTO: 11.2 FL (ref 6–12)
POTASSIUM SERPL-SCNC: 5.4 MMOL/L (ref 3.5–5.2)
PROT SERPL-MCNC: 7.3 G/DL (ref 6–8.5)
RBC # BLD AUTO: 4.81 10*6/MM3 (ref 3.77–5.28)
SODIUM SERPL-SCNC: 141 MMOL/L (ref 136–145)
TRIGL SERPL-MCNC: 99 MG/DL (ref 0–150)
TSH SERPL DL<=0.05 MIU/L-ACNC: 4.04 UIU/ML (ref 0.27–4.2)
VLDLC SERPL-MCNC: 18 MG/DL (ref 5–40)
WBC NRBC COR # BLD: 7.83 10*3/MM3 (ref 3.4–10.8)

## 2022-08-30 PROCEDURE — 85027 COMPLETE CBC AUTOMATED: CPT | Performed by: NURSE PRACTITIONER

## 2022-08-30 PROCEDURE — 80053 COMPREHEN METABOLIC PANEL: CPT | Performed by: NURSE PRACTITIONER

## 2022-08-30 PROCEDURE — 82607 VITAMIN B-12: CPT | Performed by: NURSE PRACTITIONER

## 2022-08-30 PROCEDURE — 99214 OFFICE O/P EST MOD 30 MIN: CPT | Performed by: NURSE PRACTITIONER

## 2022-08-30 PROCEDURE — 84443 ASSAY THYROID STIM HORMONE: CPT | Performed by: NURSE PRACTITIONER

## 2022-08-30 PROCEDURE — 80061 LIPID PANEL: CPT | Performed by: NURSE PRACTITIONER

## 2022-08-30 NOTE — PROGRESS NOTES
Chief Complaint   Patient presents with   • Follow-up     Cardiac management   • LABS     January 2022. Patient is wanting labs today, she has not eaten or drank  anything today.   • Shortness of Breath     Reports SOA , she attribute to acid reflux   • Med Refill     No refills needed today       Subjective       Mirna Bravo is a 82 y.o. female with hypertension, hypercholesterolemia, and mitral regurgitation who had a stroke in 2013 involving the right occipital lobe. Carotid US and echo were unremarkable. Cholesterol was elevated at that time but she has not been able to tolerate statin therapy. Her echocardiogram in 2017 showed normal LVEF and mild to mod MR.  In August 2020, she underwent back surgery without cardiac issues. In May 2021 she underwent surgery for removal of one parathyroid gland according to patient. She had barium swallow and diagnosed with GERD, symptoms improved with Pepcid.     Today she comes to the office for a follow up visit. She admits to shortness of breath and soreness with swallowing, having some difficulty with GERD management.  She admits to taking Pepcid twice a day and most days takes dicyclomine 3-4 times a day.  GI bleeding denied.  She feels kyphosis also contributes to her symptoms.  Anginal chest pain and palpitations denied.    Cardiac History:    Past Surgical History:   Procedure Laterality Date   • CARDIOVASCULAR STRESS TEST  06/06/2007    4 min,16 secs., 80% THR, Negative.   • ECHO - CONVERTED  06/06/2007    EF >60%, mild to moderate MR.   • ECHO - CONVERTED  07/01/2009    EF 50-55%, redundant mitral valve.   • ECHO - CONVERTED  03/07/2013    [facility]- EF 65%. Septal WMA.   • ECHO - CONVERTED  07/06/2017    EF 55-60%, mild to mod MR    • ECHO - CONVERTED  07/05/2021    EF 60%. MVP Mod MR. RVSP- 40 mmHg   • MRI INTRAOPERATIVE BRAIN W/ OR W/O CONTRAST  03/06/2013    Subacute Stroke in R Occipital Lobe.   • US CAROTID UNILATERAL  03/07/2013    Normal       Current Outpatient  Medications   Medication Sig Dispense Refill   • acetaminophen (TYLENOL) 325 MG tablet Take 650 mg by mouth every 6 (six) hours as needed for mild pain (1-3).     • atenolol (TENORMIN) 50 MG tablet Take 0.5 tablets by mouth 2 (Two) Times a Day. 90 tablet 3   • Calcium Citrate-Vitamin D (CALCIUM CITRATE + D PO) Take  by mouth Daily.     • clopidogrel (PLAVIX) 75 MG tablet Take 1 tablet by mouth Daily. 90 tablet 3   • dicyclomine (Bentyl) 10 MG capsule Take before meals and at bedtime as needed for stomach bloating 120 capsule 9   • famotidine (PEPCID) 20 MG tablet Take 20 mg by mouth 2 (Two) Times a Day.     • GARLIC PO Take  by mouth 2 (Two) Times a Day.     • Omega-3 Fatty Acids (FISH OIL) 1200 MG capsule delayed-release Take  by mouth 3 (three) times a day.       No current facility-administered medications for this visit.       Hydrocodone, Codeine, Imdur [isosorbide nitrate], Levaquin [levofloxacin], Niaspan [niacin er], Penicillins, Pravastatin, Tramadol, Vytorin [ezetimibe-simvastatin], and Zocor [simvastatin]    Past Medical History:   Diagnosis Date   • Acid reflux    • Broken humerus    • CVA (cerebral infarction)    • DVT of proximal leg (deep vein thrombosis) ([facility])     Left leg   • History of bilateral cataract extraction    • History of kyphoplasty    • History of parathyroidectomy ([facility]) 05/12/2021   • Hyperlipidemia    • Hypertension    • MVP (mitral valve prolapse)    • Osteopenia    • Osteoporosis    • PUD (peptic ulcer disease)        Social History     Socioeconomic History   • Marital status:    Tobacco Use   • Smoking status: Never Smoker   • Smokeless tobacco: Never Used   Vaping Use   • Vaping Use: Never used   Substance and Sexual Activity   • Alcohol use: No   • Drug use: No       Family History   Problem Relation Age of Onset   • Stroke Mother    • Hypertension Mother    • Diabetes Mother    • Hypertension Other    • Hyperlipidemia Other    • Diabetes Other    • Hypertension Child    •  "Diabetes Child        Review of Systems   Constitutional: Positive for malaise/fatigue (\"seems worse since stopping Vit B 12 supplement\"). Negative for decreased appetite, diaphoresis and fever.   HENT: Negative for nosebleeds.    Eyes: Negative for blurred vision.   Cardiovascular: Negative for chest pain, claudication, cyanosis, dyspnea on exertion, irregular heartbeat, leg swelling, near-syncope, orthopnea, palpitations, paroxysmal nocturnal dyspnea and syncope.   Respiratory: Positive for shortness of breath. Negative for sleep disturbances due to breathing.    Endocrine: Negative for cold intolerance and heat intolerance.   Hematologic/Lymphatic: Negative for adenopathy and bleeding problem. Does not bruise/bleed easily.   Skin: Negative for rash.   Musculoskeletal: Positive for arthritis, back pain and stiffness. Negative for falls and myalgias.   Gastrointestinal: Positive for bloating and heartburn. Negative for melena and nausea.   Genitourinary: Negative for dysuria and hematuria.   Neurological: Negative for dizziness and light-headedness.   Psychiatric/Behavioral: The patient does not have insomnia and is not nervous/anxious.         BP Readings from Last 5 Encounters:   08/30/22 124/70   01/31/22 136/84   06/30/21 104/64   09/29/20 110/72   01/09/20 114/72       Wt Readings from Last 5 Encounters:   08/30/22 58.2 kg (128 lb 3.2 oz)   01/31/22 59.5 kg (131 lb 3.2 oz)   06/30/21 62.6 kg (138 lb)   09/29/20 63 kg (139 lb)   01/09/20 64.9 kg (143 lb)       Objective      Labs 1/31/2022: Vitamin B12 1654, total cholesterol 197, triglycerides 133, HDL 62, , glucose 119, BUN 40, creatinine 1.05, sodium 142, potassium 5, chloride 102, CO2 24.9, calcium 9.7, total protein 7.2, albumin 4.28, ALT 10, AST 18, ALP 95, total bili 0.7, GFR 50, WBC 8.87, RBC 4.75, hemoglobin 14.1, hematocrit 45.2, platelets 274    Labs 6/30/2021: A1c 6.1, RBC 4.74, hemoglobin hematocrit 13.9 and 44.9, platelets 243, WBC 7.9, TSH " "3.03, glucose 125, BUN 14, creatinine 1.11, sodium 142, potassium 5.2, chloride 106, CO2 23.2, calcium 10, total protein 7.8, BUN 4.37, ALT 11, AST 20, ALP 84, total bili 0.5, GFR 47, total cholesterol 211, triglycerides 139, HDL 66, , vitamin B-12 916    /70 (BP Location: Left arm)   Pulse 70   Ht 149.9 cm (59.02\")   Wt 58.2 kg (128 lb 3.2 oz)   BMI 25.88 kg/m²     Vitals and nursing note reviewed.   Eyes:      Pupils: Pupils are equal, round, and reactive to light.   HENT:      Head: Normocephalic.   Neck:      Vascular: No carotid bruit.   Pulmonary:      Effort: Pulmonary effort is normal.      Breath sounds: Normal breath sounds.   Cardiovascular:      Normal rate. Regular rhythm.      Murmurs: There is a grade 2/6 systolic murmur.   Edema:     Peripheral edema absent.   Abdominal:      General: Bowel sounds are normal.      Palpations: Abdomen is soft.   Musculoskeletal:      Cervical back: Normal range of motion. Skin:     General: Skin is warm and dry.      Coloration: Skin is pale (slightly pale).   Neurological:      Mental Status: Alert, oriented to person, place, and time and oriented to person, place and time.          Procedures: none today          Assessment & Plan   Diagnoses and all orders for this visit:    1. Primary hypertension (Primary)  -     Comprehensive Metabolic Panel; Future  -     CBC (No Diff); Future    2. Mixed hyperlipidemia  -     Lipid Panel; Future    3. Medication management  -     Lipid Panel; Future  -     Comprehensive Metabolic Panel; Future  -     CBC (No Diff); Future  -     Vitamin B12; Future  -     TSH; Future    4. Vitamin B12 deficiency  -     Comprehensive Metabolic Panel; Future  -     Vitamin B12; Future    5. Other fatigue  -     TSH; Future      Patient presents today without cardiac complaints or concerns.  She remains on Plavix therapy and bleeding denied.  Her blood pressure, heart rate and rhythm are normal.  Continue current dose " atenolol.    In regards to GERD symptoms continue current medication management.  She is concerned over side effects from PPI.  Therefore we will not recommend at this time.  Informational handout on GERD diet provided.    Patient admits to more fatigue.  Repeat lab order given and we will include recheck of vitamin B12.    In regards to lipid management she has been intolerant to statin therapy.  We will continue diet and omega-3.    A 6-month follow-up visit scheduled.  Please call sooner for cardiac concerns.

## 2022-08-31 LAB — VIT B12 BLD-MCNC: 333 PG/ML (ref 211–946)

## 2022-12-27 DIAGNOSIS — I10 ESSENTIAL HYPERTENSION: ICD-10-CM

## 2022-12-27 DIAGNOSIS — Z86.73 OLD CEREBROVASCULAR ACCIDENT (CVA) WITHOUT LATE EFFECT: ICD-10-CM

## 2022-12-27 RX ORDER — ATENOLOL 50 MG/1
25 TABLET ORAL 2 TIMES DAILY
Qty: 90 TABLET | Refills: 3 | Status: SHIPPED | OUTPATIENT
Start: 2022-12-27

## 2022-12-27 RX ORDER — CLOPIDOGREL BISULFATE 75 MG/1
75 TABLET ORAL DAILY
Qty: 90 TABLET | Refills: 3 | Status: SHIPPED | OUTPATIENT
Start: 2022-12-27

## 2023-06-14 DIAGNOSIS — K21.9 GASTROESOPHAGEAL REFLUX DISEASE, UNSPECIFIED WHETHER ESOPHAGITIS PRESENT: ICD-10-CM

## 2023-06-14 RX ORDER — DICYCLOMINE HYDROCHLORIDE 10 MG/1
CAPSULE ORAL
Qty: 120 CAPSULE | Refills: 0 | Status: SHIPPED | OUTPATIENT
Start: 2023-06-14

## 2023-08-01 ENCOUNTER — OFFICE VISIT (OUTPATIENT)
Dept: CARDIOLOGY | Facility: CLINIC | Age: 83
End: 2023-08-01
Payer: MEDICARE

## 2023-08-01 ENCOUNTER — LAB (OUTPATIENT)
Dept: LAB | Facility: HOSPITAL | Age: 83
End: 2023-08-01
Payer: MEDICARE

## 2023-08-01 VITALS
DIASTOLIC BLOOD PRESSURE: 78 MMHG | SYSTOLIC BLOOD PRESSURE: 112 MMHG | WEIGHT: 128.4 LBS | HEART RATE: 76 BPM | HEIGHT: 59 IN | BODY MASS INDEX: 25.88 KG/M2

## 2023-08-01 DIAGNOSIS — K21.9 GASTROESOPHAGEAL REFLUX DISEASE, UNSPECIFIED WHETHER ESOPHAGITIS PRESENT: ICD-10-CM

## 2023-08-01 DIAGNOSIS — I10 PRIMARY HYPERTENSION: Primary | ICD-10-CM

## 2023-08-01 DIAGNOSIS — Z86.73 OLD CEREBROVASCULAR ACCIDENT (CVA) WITHOUT LATE EFFECT: ICD-10-CM

## 2023-08-01 DIAGNOSIS — E83.42 HYPOMAGNESEMIA: ICD-10-CM

## 2023-08-01 DIAGNOSIS — E78.2 MIXED HYPERLIPIDEMIA: ICD-10-CM

## 2023-08-01 DIAGNOSIS — I10 PRIMARY HYPERTENSION: ICD-10-CM

## 2023-08-01 DIAGNOSIS — R11.0 NAUSEA: ICD-10-CM

## 2023-08-01 DIAGNOSIS — Z79.899 MEDICATION MANAGEMENT: ICD-10-CM

## 2023-08-01 DIAGNOSIS — Z51.81 MEDICATION MONITORING ENCOUNTER: ICD-10-CM

## 2023-08-01 LAB
ALBUMIN SERPL-MCNC: 3.6 G/DL (ref 3.5–5.2)
ALBUMIN/GLOB SERPL: 1.4 G/DL
ALP SERPL-CCNC: 123 U/L (ref 39–117)
ALT SERPL W P-5'-P-CCNC: 13 U/L (ref 1–33)
ANION GAP SERPL CALCULATED.3IONS-SCNC: 10.6 MMOL/L (ref 5–15)
AST SERPL-CCNC: 21 U/L (ref 1–32)
BASOPHILS # BLD AUTO: 0.03 10*3/MM3 (ref 0–0.2)
BASOPHILS NFR BLD AUTO: 0.4 % (ref 0–1.5)
BILIRUB SERPL-MCNC: 0.4 MG/DL (ref 0–1.2)
BUN SERPL-MCNC: 18 MG/DL (ref 8–23)
BUN/CREAT SERPL: 19.6 (ref 7–25)
CALCIUM SPEC-SCNC: 9 MG/DL (ref 8.6–10.5)
CHLORIDE SERPL-SCNC: 102 MMOL/L (ref 98–107)
CHOLEST SERPL-MCNC: 148 MG/DL (ref 0–200)
CO2 SERPL-SCNC: 26.4 MMOL/L (ref 22–29)
CREAT SERPL-MCNC: 0.92 MG/DL (ref 0.57–1)
DEPRECATED RDW RBC AUTO: 58.2 FL (ref 37–54)
EGFRCR SERPLBLD CKD-EPI 2021: 61.1 ML/MIN/1.73
EOSINOPHIL # BLD AUTO: 0.13 10*3/MM3 (ref 0–0.4)
EOSINOPHIL NFR BLD AUTO: 1.6 % (ref 0.3–6.2)
ERYTHROCYTE [DISTWIDTH] IN BLOOD BY AUTOMATED COUNT: 16 % (ref 12.3–15.4)
GLOBULIN UR ELPH-MCNC: 2.6 GM/DL
GLUCOSE SERPL-MCNC: 108 MG/DL (ref 65–99)
HCT VFR BLD AUTO: 37.8 % (ref 34–46.6)
HDLC SERPL-MCNC: 72 MG/DL (ref 40–60)
HGB BLD-MCNC: 11.7 G/DL (ref 12–15.9)
IMM GRANULOCYTES # BLD AUTO: 0.03 10*3/MM3 (ref 0–0.05)
IMM GRANULOCYTES NFR BLD AUTO: 0.4 % (ref 0–0.5)
LDLC SERPL CALC-MCNC: 61 MG/DL (ref 0–100)
LDLC/HDLC SERPL: 0.85 {RATIO}
LYMPHOCYTES # BLD AUTO: 3.33 10*3/MM3 (ref 0.7–3.1)
LYMPHOCYTES NFR BLD AUTO: 41.8 % (ref 19.6–45.3)
MAGNESIUM SERPL-MCNC: 1.8 MG/DL (ref 1.6–2.4)
MCH RBC QN AUTO: 30.4 PG (ref 26.6–33)
MCHC RBC AUTO-ENTMCNC: 31 G/DL (ref 31.5–35.7)
MCV RBC AUTO: 98.2 FL (ref 79–97)
MONOCYTES # BLD AUTO: 0.7 10*3/MM3 (ref 0.1–0.9)
MONOCYTES NFR BLD AUTO: 8.8 % (ref 5–12)
NEUTROPHILS NFR BLD AUTO: 3.74 10*3/MM3 (ref 1.7–7)
NEUTROPHILS NFR BLD AUTO: 47 % (ref 42.7–76)
NRBC BLD AUTO-RTO: 0 /100 WBC (ref 0–0.2)
PLATELET # BLD AUTO: 245 10*3/MM3 (ref 140–450)
PMV BLD AUTO: 11.1 FL (ref 6–12)
POTASSIUM SERPL-SCNC: 4.8 MMOL/L (ref 3.5–5.2)
PROT SERPL-MCNC: 6.2 G/DL (ref 6–8.5)
RBC # BLD AUTO: 3.85 10*6/MM3 (ref 3.77–5.28)
SODIUM SERPL-SCNC: 139 MMOL/L (ref 136–145)
TRIGL SERPL-MCNC: 75 MG/DL (ref 0–150)
VLDLC SERPL-MCNC: 15 MG/DL (ref 5–40)
WBC NRBC COR # BLD: 7.96 10*3/MM3 (ref 3.4–10.8)

## 2023-08-01 PROCEDURE — 80061 LIPID PANEL: CPT | Performed by: NURSE PRACTITIONER

## 2023-08-01 PROCEDURE — 80053 COMPREHEN METABOLIC PANEL: CPT | Performed by: NURSE PRACTITIONER

## 2023-08-01 PROCEDURE — 83735 ASSAY OF MAGNESIUM: CPT | Performed by: NURSE PRACTITIONER

## 2023-08-01 PROCEDURE — 85025 COMPLETE CBC W/AUTO DIFF WBC: CPT | Performed by: NURSE PRACTITIONER

## 2023-08-01 RX ORDER — ONDANSETRON 4 MG/1
4 TABLET, FILM COATED ORAL EVERY 8 HOURS PRN
Qty: 90 TABLET | Refills: 3 | Status: SHIPPED | OUTPATIENT
Start: 2023-08-01

## 2023-08-01 RX ORDER — LATANOPROST 50 UG/ML
1 SOLUTION/ DROPS OPHTHALMIC NIGHTLY
COMMUNITY

## 2023-08-01 RX ORDER — DORZOLAMIDE HYDROCHLORIDE AND TIMOLOL MALEATE 20; 5 MG/ML; MG/ML
1 SOLUTION/ DROPS OPHTHALMIC 2 TIMES DAILY
COMMUNITY

## 2023-08-01 RX ORDER — ONDANSETRON 4 MG/1
4 TABLET, FILM COATED ORAL EVERY 8 HOURS PRN
COMMUNITY
End: 2023-08-01 | Stop reason: SDUPTHER

## 2023-08-01 RX ORDER — DICYCLOMINE HYDROCHLORIDE 10 MG/1
CAPSULE ORAL
Qty: 120 CAPSULE | Refills: 0 | Status: SHIPPED | OUTPATIENT
Start: 2023-08-01

## 2023-08-01 RX ORDER — ATENOLOL 50 MG/1
25 TABLET ORAL 2 TIMES DAILY
Qty: 90 TABLET | Refills: 3 | Status: SHIPPED | OUTPATIENT
Start: 2023-08-01

## 2024-02-08 ENCOUNTER — OFFICE VISIT (OUTPATIENT)
Dept: CARDIOLOGY | Facility: CLINIC | Age: 84
End: 2024-02-08
Payer: MEDICARE

## 2024-02-08 ENCOUNTER — LAB (OUTPATIENT)
Dept: LAB | Facility: HOSPITAL | Age: 84
End: 2024-02-08
Payer: MEDICARE

## 2024-02-08 VITALS
DIASTOLIC BLOOD PRESSURE: 60 MMHG | WEIGHT: 125.6 LBS | SYSTOLIC BLOOD PRESSURE: 128 MMHG | BODY MASS INDEX: 25.32 KG/M2 | HEART RATE: 60 BPM | HEIGHT: 59 IN

## 2024-02-08 DIAGNOSIS — Z79.899 MEDICATION MANAGEMENT: ICD-10-CM

## 2024-02-08 DIAGNOSIS — I10 PRIMARY HYPERTENSION: ICD-10-CM

## 2024-02-08 DIAGNOSIS — K21.9 GASTROESOPHAGEAL REFLUX DISEASE, UNSPECIFIED WHETHER ESOPHAGITIS PRESENT: ICD-10-CM

## 2024-02-08 DIAGNOSIS — E78.2 MIXED HYPERLIPIDEMIA: ICD-10-CM

## 2024-02-08 DIAGNOSIS — Z86.73 OLD CEREBROVASCULAR ACCIDENT (CVA) WITHOUT LATE EFFECT: ICD-10-CM

## 2024-02-08 DIAGNOSIS — I10 PRIMARY HYPERTENSION: Primary | ICD-10-CM

## 2024-02-08 LAB
ALBUMIN SERPL-MCNC: 4.1 G/DL (ref 3.5–5.2)
ALBUMIN/GLOB SERPL: 1.2 G/DL
ALP SERPL-CCNC: 81 U/L (ref 39–117)
ALT SERPL W P-5'-P-CCNC: 9 U/L (ref 1–33)
ANION GAP SERPL CALCULATED.3IONS-SCNC: 12.4 MMOL/L (ref 5–15)
AST SERPL-CCNC: 17 U/L (ref 1–32)
BASOPHILS # BLD AUTO: 0.03 10*3/MM3 (ref 0–0.2)
BASOPHILS NFR BLD AUTO: 0.3 % (ref 0–1.5)
BILIRUB SERPL-MCNC: 0.6 MG/DL (ref 0–1.2)
BUN SERPL-MCNC: 15 MG/DL (ref 8–23)
BUN/CREAT SERPL: 13.5 (ref 7–25)
CALCIUM SPEC-SCNC: 9.5 MG/DL (ref 8.6–10.5)
CHLORIDE SERPL-SCNC: 101 MMOL/L (ref 98–107)
CHOLEST SERPL-MCNC: 217 MG/DL (ref 0–200)
CO2 SERPL-SCNC: 25.6 MMOL/L (ref 22–29)
CREAT SERPL-MCNC: 1.11 MG/DL (ref 0.57–1)
DEPRECATED RDW RBC AUTO: 53.3 FL (ref 37–54)
EGFRCR SERPLBLD CKD-EPI 2021: 48.5 ML/MIN/1.73
EOSINOPHIL # BLD AUTO: 0.23 10*3/MM3 (ref 0–0.4)
EOSINOPHIL NFR BLD AUTO: 2.7 % (ref 0.3–6.2)
ERYTHROCYTE [DISTWIDTH] IN BLOOD BY AUTOMATED COUNT: 16.5 % (ref 12.3–15.4)
GLOBULIN UR ELPH-MCNC: 3.3 GM/DL
GLUCOSE SERPL-MCNC: 112 MG/DL (ref 65–99)
HCT VFR BLD AUTO: 42.9 % (ref 34–46.6)
HDLC SERPL-MCNC: 70 MG/DL (ref 40–60)
HGB BLD-MCNC: 12.8 G/DL (ref 12–15.9)
IMM GRANULOCYTES # BLD AUTO: 0.03 10*3/MM3 (ref 0–0.05)
IMM GRANULOCYTES NFR BLD AUTO: 0.3 % (ref 0–0.5)
LDLC SERPL CALC-MCNC: 125 MG/DL (ref 0–100)
LDLC/HDLC SERPL: 1.74 {RATIO}
LYMPHOCYTES # BLD AUTO: 3.97 10*3/MM3 (ref 0.7–3.1)
LYMPHOCYTES NFR BLD AUTO: 46.1 % (ref 19.6–45.3)
MCH RBC QN AUTO: 26.3 PG (ref 26.6–33)
MCHC RBC AUTO-ENTMCNC: 29.8 G/DL (ref 31.5–35.7)
MCV RBC AUTO: 88.3 FL (ref 79–97)
MONOCYTES # BLD AUTO: 0.58 10*3/MM3 (ref 0.1–0.9)
MONOCYTES NFR BLD AUTO: 6.7 % (ref 5–12)
NEUTROPHILS NFR BLD AUTO: 3.77 10*3/MM3 (ref 1.7–7)
NEUTROPHILS NFR BLD AUTO: 43.9 % (ref 42.7–76)
NRBC BLD AUTO-RTO: 0 /100 WBC (ref 0–0.2)
PLATELET # BLD AUTO: 266 10*3/MM3 (ref 140–450)
PMV BLD AUTO: 11.1 FL (ref 6–12)
POTASSIUM SERPL-SCNC: 4.6 MMOL/L (ref 3.5–5.2)
PROT SERPL-MCNC: 7.4 G/DL (ref 6–8.5)
RBC # BLD AUTO: 4.86 10*6/MM3 (ref 3.77–5.28)
SODIUM SERPL-SCNC: 139 MMOL/L (ref 136–145)
TRIGL SERPL-MCNC: 127 MG/DL (ref 0–150)
TSH SERPL DL<=0.05 MIU/L-ACNC: 2.26 UIU/ML (ref 0.27–4.2)
VLDLC SERPL-MCNC: 22 MG/DL (ref 5–40)
WBC NRBC COR # BLD AUTO: 8.61 10*3/MM3 (ref 3.4–10.8)

## 2024-02-08 PROCEDURE — 1160F RVW MEDS BY RX/DR IN RCRD: CPT | Performed by: NURSE PRACTITIONER

## 2024-02-08 PROCEDURE — 1159F MED LIST DOCD IN RCRD: CPT | Performed by: NURSE PRACTITIONER

## 2024-02-08 PROCEDURE — 99213 OFFICE O/P EST LOW 20 MIN: CPT | Performed by: NURSE PRACTITIONER

## 2024-02-08 PROCEDURE — 84443 ASSAY THYROID STIM HORMONE: CPT | Performed by: NURSE PRACTITIONER

## 2024-02-08 PROCEDURE — 80053 COMPREHEN METABOLIC PANEL: CPT | Performed by: NURSE PRACTITIONER

## 2024-02-08 PROCEDURE — 85025 COMPLETE CBC W/AUTO DIFF WBC: CPT | Performed by: NURSE PRACTITIONER

## 2024-02-08 PROCEDURE — 80061 LIPID PANEL: CPT | Performed by: NURSE PRACTITIONER

## 2024-02-08 RX ORDER — CLOPIDOGREL BISULFATE 75 MG/1
75 TABLET ORAL DAILY
Qty: 90 TABLET | Refills: 2 | Status: SHIPPED | OUTPATIENT
Start: 2024-02-08

## 2024-02-08 RX ORDER — CLOPIDOGREL BISULFATE 75 MG/1
75 TABLET ORAL DAILY
COMMUNITY
End: 2024-02-08 | Stop reason: SDUPTHER

## 2024-02-08 RX ORDER — DICYCLOMINE HYDROCHLORIDE 10 MG/1
CAPSULE ORAL
Qty: 120 CAPSULE | Refills: 2 | Status: SHIPPED | OUTPATIENT
Start: 2024-02-08

## 2024-02-08 RX ORDER — ATENOLOL 25 MG/1
25 TABLET ORAL 2 TIMES DAILY
COMMUNITY
End: 2024-02-08 | Stop reason: SDUPTHER

## 2024-02-08 RX ORDER — CYANOCOBALAMIN (VITAMIN B-12) 1000 MCG
1 TABLET ORAL DAILY
COMMUNITY

## 2024-02-08 RX ORDER — ATENOLOL 25 MG/1
25 TABLET ORAL 2 TIMES DAILY
Qty: 90 TABLET | Refills: 2 | Status: SHIPPED | OUTPATIENT
Start: 2024-02-08

## 2024-02-08 NOTE — PROGRESS NOTES
Chief Complaint   Patient presents with    Follow-up     Cardiac management    LABS     Will need labs,. She has not eaten or drank today to have labs done.     Med Refill     Needs refills on Bentyl,Atenolol and Plavix 90 day supply to [facility]       Subjective       Mirna Bravo is a 84 y.o. female with HTN, hypercholesterolemia, and MR.  In 2013 diagnosed stroke right occipital lobe.  Carotid ultrasound echo were unremarkable.  She is unable to tolerate statin therapy.  Echocardiogram in 2017 showed normal EF and mild to moderate MR.  2020 she underwent back surgery without problems.  In 2021 she underwent removal of 1 parathyroid gland.  Later diagnosed with GERD, symptoms improved with Pepcid.  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  Eliquis prescribed.  She underwent rehab at [facility] due to deconditioning.    Today she returns to the office for follow-up visit.  She follows routinely with eye specialist and reports vision has stabilized with medication management.  From a cardiac standpoint she denies chest pain, palpitations, or increased shortness of breath.  She has mild swelling in her lower legs especially the left which had previously DVT.  Lower extremity pain or redness denied.    Cardiac History:    Past Surgical History:   Procedure Laterality Date    CARDIOVASCULAR STRESS TEST  06/06/2007    4 min,16 secs., 80% THR, Negative.    ECHO - CONVERTED  06/06/2007    EF >60%, mild to moderate MR.    ECHO - CONVERTED  07/01/2009    EF 50-55%, redundant mitral valve.    ECHO - CONVERTED  03/07/2013    [facility]- EF 65%. Septal WMA.    ECHO - CONVERTED  07/06/2017    EF 55-60%, mild to mod MR     ECHO - CONVERTED  07/05/2021    EF 60%. MVP Mod MR. RVSP- 40 mmHg    MRI INTRAOPERATIVE BRAIN W/ OR W/O CONTRAST  03/06/2013    Subacute Stroke in R Occipital Lobe.    US CAROTID UNILATERAL  03/07/2013    Normal        Current Outpatient Medications   Medication Sig Dispense Refill    Artificial Tear Ointment (artificial tears) ophthalmic ointment Administer  to the right eye Every 1 (One) Hour As Needed.      atenolol (TENORMIN) 25 MG tablet Take 1 tablet by mouth 2 (Two) Times a Day. 90 tablet 2    Calcium Citrate-Vitamin D (CALCIUM CITRATE + D PO) Take  by mouth Daily.      clopidogrel (PLAVIX) 75 MG tablet Take 1 tablet by mouth Daily. 90 tablet 2    Cyanocobalamin (B-12) 1000 MCG tablet Take 1 tablet by mouth Daily.      dicyclomine (BENTYL) 10 MG capsule TAKE 1 CAPSULE BY MOUTH BEFORE MEALS AND AT BEDTIME AS NEEDED FOR STOMACH BLOATING 120 capsule 2    famotidine (PEPCID) 20 MG tablet Take 1 tablet by mouth 2 (Two) Times a Day.      GARLIC PO Take  by mouth 2 (Two) Times a Day.      latanoprost (XALATAN) 0.005 % ophthalmic solution Administer 1 drop to the right eye Every Night.      Omega-3 Fatty Acids (FISH OIL) 1200 MG capsule delayed-release Take 1 capsule by mouth 2 (Two) Times a Day.      ondansetron (ZOFRAN) 4 MG tablet Take 1 tablet by mouth Every 8 (Eight) Hours As Needed for Nausea or Vomiting. 90 tablet 3     No current facility-administered medications for this visit.       Tegretol [carbamazepine], Hydrocodone, Codeine, Imdur [isosorbide nitrate], Levaquin [levofloxacin], Niaspan [niacin er], Penicillins, Pravastatin, Tramadol, Vytorin [ezetimibe-simvastatin], and Zocor [simvastatin]    Past Medical History:   Diagnosis Date    Acid reflux     Broken humerus     CVA (cerebral infarction)     DVT of proximal leg (deep vein thrombosis)     Left leg    History of bilateral cataract extraction     History of kyphoplasty     History of parathyroidectomy 05/12/2021    Hyperlipidemia     Hypertension     MVP (mitral valve prolapse)     Osteopenia     Osteoporosis     PUD (peptic ulcer disease)        Social History     Socioeconomic History    Marital status:    Tobacco Use    Smoking status: Never     "Smokeless tobacco: Never   Vaping Use    Vaping Use: Never used   Substance and Sexual Activity    Alcohol use: No    Drug use: No       Family History   Problem Relation Age of Onset    Stroke Mother     Hypertension Mother     Diabetes Mother     Hypertension Other     Hyperlipidemia Other     Diabetes Other     Hypertension Child     Diabetes Child        Review of Systems   Eyes:  Positive for visual disturbance.   Cardiovascular:  Positive for leg swelling. Negative for chest pain, dyspnea on exertion, near-syncope and palpitations.   Respiratory:  Positive for shortness of breath.    Hematologic/Lymphatic: Negative for bleeding problem.   Skin:  Negative for color change.   Musculoskeletal:  Positive for arthritis, back pain and stiffness. Negative for falls.        BP Readings from Last 5 Encounters:   02/08/24 128/60   08/01/23 112/78   08/30/22 124/70   01/31/22 136/84   06/30/21 104/64       Wt Readings from Last 5 Encounters:   02/08/24 57 kg (125 lb 9.6 oz)   08/01/23 58.2 kg (128 lb 6.4 oz)   08/30/22 58.2 kg (128 lb 3.2 oz)   01/31/22 59.5 kg (131 lb 3.2 oz)   06/30/21 62.6 kg (138 lb)       Objective     /60 (BP Location: Left arm, Patient Position: Sitting)   Pulse 60   Ht 149.9 cm (59.02\")   Wt 57 kg (125 lb 9.6 oz)   BMI 25.35 kg/m²     Vitals and nursing note reviewed.   Constitutional:       Appearance: Healthy appearance. Not in distress.   Eyes:      Conjunctiva/sclera: Conjunctivae normal.      Pupils: Pupils are equal, round, and reactive to light.   HENT:      Head: Normocephalic.   Pulmonary:      Effort: Pulmonary effort is normal.      Breath sounds: Normal breath sounds.   Cardiovascular:      PMI at left midclavicular line. Normal rate. Regular rhythm.      Murmurs: There is a grade 2/6 systolic murmur.   Edema:     Pretibial: trace edema of the left pretibial area.     Ankle: trace edema of the left ankle.     Feet: trace edema of the left foot.  Abdominal:      General: " Bowel sounds are normal.      Palpations: Abdomen is soft.   Musculoskeletal:         General: Deformity (Back) present.      Cervical back: Normal range of motion and neck supple. Skin:     General: Skin is warm and dry.   Neurological:      Mental Status: Alert, oriented to person, place, and time and oriented to person, place and time.          Procedures: None today         Assessment & Plan   Diagnoses and all orders for this visit:    1. Primary hypertension (Primary)  -     Comprehensive Metabolic Panel; Future  -     CBC & Differential; Future  -     atenolol (TENORMIN) 25 MG tablet; Take 1 tablet by mouth 2 (Two) Times a Day.  Dispense: 90 tablet; Refill: 2    2. Mixed hyperlipidemia  -     Comprehensive Metabolic Panel; Future  -     Lipid Panel; Future    3. Old cerebrovascular accident (CVA) without late effect  -     clopidogrel (PLAVIX) 75 MG tablet; Take 1 tablet by mouth Daily.  Dispense: 90 tablet; Refill: 2    4. Medication management  -     TSH; Future  -     Comprehensive Metabolic Panel; Future  -     CBC & Differential; Future  -     Lipid Panel; Future    5. Gastroesophageal reflux disease, unspecified whether esophagitis present  -     dicyclomine (BENTYL) 10 MG capsule; TAKE 1 CAPSULE BY MOUTH BEFORE MEALS AND AT BEDTIME AS NEEDED FOR STOMACH BLOATING  Dispense: 120 capsule; Refill: 2        Hypertension  -BP controlled.  Heart rate and rhythm normal  -Continue atenolol 25 mg daily     Mixed hyperlipidemia  -Continue fish oil  -Continue diet     History old CVA/recent ocular thrombus  -Following with neurology and ophthalmology at   - Now on Plavix and off Eliquis.  Bleeding denied.  -Denies recent TIA symptoms or visual changes.     GERD/nausea  -Continue small meals and avoid eating prior to bedtime  -Patient midst to improvement of symptoms since addition of dicyclomine  -Continue Zofran as needed for management of nausea    For medication management lab order given as noted  above.    Keep appointment for 8/1/2024.  Patient understands to call sooner for cardiac concerns.             Electronically signed by AMARIILS Mcgill,  February 8, 2024 15:02 EST    Dictated Utilizing Dragon Dictation: Part of this note may be an electronic transcription/translation of spoken language to printed text using the Dragon Dictation System.

## 2024-08-01 ENCOUNTER — OFFICE VISIT (OUTPATIENT)
Dept: CARDIOLOGY | Facility: CLINIC | Age: 84
End: 2024-08-01
Payer: MEDICARE

## 2024-08-01 ENCOUNTER — TELEPHONE (OUTPATIENT)
Dept: CARDIOLOGY | Facility: CLINIC | Age: 84
End: 2024-08-01

## 2024-08-01 ENCOUNTER — LAB (OUTPATIENT)
Dept: LAB | Facility: HOSPITAL | Age: 84
End: 2024-08-01
Payer: MEDICARE

## 2024-08-01 VITALS
BODY MASS INDEX: 25.12 KG/M2 | HEART RATE: 70 BPM | DIASTOLIC BLOOD PRESSURE: 60 MMHG | HEIGHT: 59 IN | SYSTOLIC BLOOD PRESSURE: 100 MMHG | WEIGHT: 124.6 LBS

## 2024-08-01 DIAGNOSIS — G89.29 CHRONIC BILATERAL THORACIC BACK PAIN: ICD-10-CM

## 2024-08-01 DIAGNOSIS — R53.83 OTHER FATIGUE: ICD-10-CM

## 2024-08-01 DIAGNOSIS — E78.2 MIXED HYPERLIPIDEMIA: ICD-10-CM

## 2024-08-01 DIAGNOSIS — I10 PRIMARY HYPERTENSION: Primary | ICD-10-CM

## 2024-08-01 DIAGNOSIS — E53.8 VITAMIN B 12 DEFICIENCY: ICD-10-CM

## 2024-08-01 DIAGNOSIS — I10 PRIMARY HYPERTENSION: ICD-10-CM

## 2024-08-01 DIAGNOSIS — M54.6 CHRONIC BILATERAL THORACIC BACK PAIN: ICD-10-CM

## 2024-08-01 DIAGNOSIS — Z79.899 MEDICATION MANAGEMENT: ICD-10-CM

## 2024-08-01 DIAGNOSIS — K21.9 GASTROESOPHAGEAL REFLUX DISEASE, UNSPECIFIED WHETHER ESOPHAGITIS PRESENT: ICD-10-CM

## 2024-08-01 DIAGNOSIS — Z86.73 OLD CEREBROVASCULAR ACCIDENT (CVA) WITHOUT LATE EFFECT: ICD-10-CM

## 2024-08-01 LAB
ALBUMIN SERPL-MCNC: 4 G/DL (ref 3.5–5.2)
ALBUMIN/GLOB SERPL: 1.3 G/DL
ALP SERPL-CCNC: 85 U/L (ref 39–117)
ALT SERPL W P-5'-P-CCNC: 9 U/L (ref 1–33)
ANION GAP SERPL CALCULATED.3IONS-SCNC: 13.3 MMOL/L (ref 5–15)
AST SERPL-CCNC: 18 U/L (ref 1–32)
BASOPHILS # BLD AUTO: 0.03 10*3/MM3 (ref 0–0.2)
BASOPHILS NFR BLD AUTO: 0.3 % (ref 0–1.5)
BILIRUB SERPL-MCNC: 0.4 MG/DL (ref 0–1.2)
BUN SERPL-MCNC: 20 MG/DL (ref 8–23)
BUN/CREAT SERPL: 20.4 (ref 7–25)
CALCIUM SPEC-SCNC: 9.5 MG/DL (ref 8.6–10.5)
CHLORIDE SERPL-SCNC: 106 MMOL/L (ref 98–107)
CHOLEST SERPL-MCNC: 202 MG/DL (ref 0–200)
CO2 SERPL-SCNC: 24.7 MMOL/L (ref 22–29)
CREAT SERPL-MCNC: 0.98 MG/DL (ref 0.57–1)
DEPRECATED RDW RBC AUTO: 52.1 FL (ref 37–54)
EGFRCR SERPLBLD CKD-EPI 2021: 56.3 ML/MIN/1.73
EOSINOPHIL # BLD AUTO: 0.28 10*3/MM3 (ref 0–0.4)
EOSINOPHIL NFR BLD AUTO: 3 % (ref 0.3–6.2)
ERYTHROCYTE [DISTWIDTH] IN BLOOD BY AUTOMATED COUNT: 15.2 % (ref 12.3–15.4)
GLOBULIN UR ELPH-MCNC: 3.2 GM/DL
GLUCOSE SERPL-MCNC: 117 MG/DL (ref 65–99)
HCT VFR BLD AUTO: 44.4 % (ref 34–46.6)
HDLC SERPL-MCNC: 65 MG/DL (ref 40–60)
HGB BLD-MCNC: 13.3 G/DL (ref 12–15.9)
IMM GRANULOCYTES # BLD AUTO: 0.02 10*3/MM3 (ref 0–0.05)
IMM GRANULOCYTES NFR BLD AUTO: 0.2 % (ref 0–0.5)
LDLC SERPL CALC-MCNC: 112 MG/DL (ref 0–100)
LDLC/HDLC SERPL: 1.66 {RATIO}
LYMPHOCYTES # BLD AUTO: 4.3 10*3/MM3 (ref 0.7–3.1)
LYMPHOCYTES NFR BLD AUTO: 46.1 % (ref 19.6–45.3)
MCH RBC QN AUTO: 27.8 PG (ref 26.6–33)
MCHC RBC AUTO-ENTMCNC: 30 G/DL (ref 31.5–35.7)
MCV RBC AUTO: 92.9 FL (ref 79–97)
MONOCYTES # BLD AUTO: 0.71 10*3/MM3 (ref 0.1–0.9)
MONOCYTES NFR BLD AUTO: 7.6 % (ref 5–12)
NEUTROPHILS NFR BLD AUTO: 3.99 10*3/MM3 (ref 1.7–7)
NEUTROPHILS NFR BLD AUTO: 42.8 % (ref 42.7–76)
NRBC BLD AUTO-RTO: 0 /100 WBC (ref 0–0.2)
PLATELET # BLD AUTO: 236 10*3/MM3 (ref 140–450)
PMV BLD AUTO: 11.1 FL (ref 6–12)
POTASSIUM SERPL-SCNC: 4.7 MMOL/L (ref 3.5–5.2)
PROT SERPL-MCNC: 7.2 G/DL (ref 6–8.5)
RBC # BLD AUTO: 4.78 10*6/MM3 (ref 3.77–5.28)
SODIUM SERPL-SCNC: 144 MMOL/L (ref 136–145)
TRIGL SERPL-MCNC: 144 MG/DL (ref 0–150)
VLDLC SERPL-MCNC: 25 MG/DL (ref 5–40)
WBC NRBC COR # BLD AUTO: 9.33 10*3/MM3 (ref 3.4–10.8)

## 2024-08-01 PROCEDURE — 85025 COMPLETE CBC W/AUTO DIFF WBC: CPT

## 2024-08-01 PROCEDURE — 36415 COLL VENOUS BLD VENIPUNCTURE: CPT

## 2024-08-01 PROCEDURE — 1159F MED LIST DOCD IN RCRD: CPT | Performed by: NURSE PRACTITIONER

## 2024-08-01 PROCEDURE — 82607 VITAMIN B-12: CPT

## 2024-08-01 PROCEDURE — 80053 COMPREHEN METABOLIC PANEL: CPT

## 2024-08-01 PROCEDURE — 80061 LIPID PANEL: CPT

## 2024-08-01 PROCEDURE — 1160F RVW MEDS BY RX/DR IN RCRD: CPT | Performed by: NURSE PRACTITIONER

## 2024-08-01 PROCEDURE — 99214 OFFICE O/P EST MOD 30 MIN: CPT | Performed by: NURSE PRACTITIONER

## 2024-08-01 RX ORDER — DICYCLOMINE HYDROCHLORIDE 10 MG/1
CAPSULE ORAL
Qty: 120 CAPSULE | Refills: 2 | Status: SHIPPED | OUTPATIENT
Start: 2024-08-01

## 2024-08-01 RX ORDER — CLOPIDOGREL BISULFATE 75 MG/1
75 TABLET ORAL DAILY
Qty: 90 TABLET | Refills: 2 | Status: SHIPPED | OUTPATIENT
Start: 2024-08-01

## 2024-08-01 RX ORDER — ATENOLOL 25 MG/1
25 TABLET ORAL 2 TIMES DAILY
Qty: 90 TABLET | Refills: 2 | Status: SHIPPED | OUTPATIENT
Start: 2024-08-01

## 2024-08-01 NOTE — TELEPHONE ENCOUNTER
Caller: DR. APARNA GAMINO    Relationship:     Best call back number: [phone]    What is the best time to reach you: ANY    What was the call regarding: ONLY PARTIAL REFERRAL CAME THROUGH PLEASE REFAX INCLUDING MRI. PLEASE CALL IF THERE IS AN ISSUE. .[phone]    Is it okay if the provider responds through MyChart: NO

## 2024-08-01 NOTE — PROGRESS NOTES
Chief Complaint   Patient presents with    Follow-up     Cardiac management.  Patient would like to discuss a referral to Dr Basil FARMER     February 2024 results in chart . She has not eaten breakfast and would like to have labs done at [facility]. She asked for a B12  level to be added    Shortness of Breath     Has SOA with exertion    Med Refill     Needs refills on atenolol,Plavix and bentyl 90 day supply to [facility]       Subjective       Mirna Bravo is a 84 y.o. female with HTN, hypercholesterolemia, and MR.  In 2013 diagnosed stroke right occipital lobe.  Carotid ultrasound echo were unremarkable.  She is unable to tolerate statin therapy.  Echocardiogram in 2017 showed normal EF and mild to moderate MR.  2020 she underwent back surgery without problems.  In 2021 she underwent removal of 1 parathyroid gland.  Later diagnosed with GERD, symptoms improved with Pepcid.  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  Eliquis prescribed.  She underwent rehab at [facility] due to deconditioning.     Today she returns to the office for a follow-up visit.  Her main concern is shortness of breath with exertion, contributory to kyphosis.  She has not had angina, palpitations, or significant edema.  Most mornings she has lightheadedness or dizziness which she attributes to lower blood pressure.  Appetite remains good.  She request repeat labs including vitamin B12 due to fatigue.    Cardiac History:    Past Surgical History:   Procedure Laterality Date    CARDIOVASCULAR STRESS TEST  06/06/2007    4 min,16 secs., 80% THR, Negative.    ECHO - CONVERTED  06/06/2007    EF >60%, mild to moderate MR.    ECHO - CONVERTED  07/01/2009    EF 50-55%, redundant mitral valve.    ECHO - CONVERTED  03/07/2013    [facility]- EF 65%. Septal WMA.    ECHO - CONVERTED  07/06/2017    EF 55-60%, mild to mod MR     ECHO - CONVERTED  07/05/2021     EF 60%. MVP Mod MR. RVSP- 40 mmHg    MRI INTRAOPERATIVE BRAIN W/ OR W/O CONTRAST  03/06/2013    Subacute Stroke in R Occipital Lobe.    US CAROTID UNILATERAL  03/07/2013    Normal       Current Outpatient Medications   Medication Sig Dispense Refill    Artificial Tear Ointment (artificial tears) ophthalmic ointment Administer  to the right eye Every 1 (One) Hour As Needed.      atenolol (TENORMIN) 25 MG tablet Take 1 tablet by mouth 2 (Two) Times a Day. 90 tablet 2    Calcium Citrate-Vitamin D (CALCIUM CITRATE + D PO) Take  by mouth Daily.      clopidogrel (PLAVIX) 75 MG tablet Take 1 tablet by mouth Daily. 90 tablet 2    Cyanocobalamin (B-12) 1000 MCG tablet Take 1 tablet by mouth Daily.      dicyclomine (BENTYL) 10 MG capsule TAKE 1 CAPSULE BY MOUTH BEFORE MEALS AND AT BEDTIME AS NEEDED FOR STOMACH BLOATING 120 capsule 2    famotidine (PEPCID) 20 MG tablet Take 1 tablet by mouth 2 (Two) Times a Day.      GARLIC PO Take 1,000 mg by mouth Daily.      latanoprost (XALATAN) 0.005 % ophthalmic solution Administer 1 drop to the right eye Every Night.      Omega-3 Fatty Acids (FISH OIL) 1200 MG capsule delayed-release Take 1 capsule by mouth 2 (Two) Times a Day.      ondansetron (ZOFRAN) 4 MG tablet Take 1 tablet by mouth Every 8 (Eight) Hours As Needed for Nausea or Vomiting. 90 tablet 3     No current facility-administered medications for this visit.       Tegretol [carbamazepine], Hydrocodone, Codeine, Imdur [isosorbide nitrate], Levaquin [levofloxacin], Niaspan [niacin er], Penicillins, Pravastatin, Tramadol, Vytorin [ezetimibe-simvastatin], and Zocor [simvastatin]    Past Medical History:   Diagnosis Date    Acid reflux     Broken humerus     CVA (cerebral infarction)     DVT of proximal leg (deep vein thrombosis)     Left leg    History of bilateral cataract extraction     History of kyphoplasty     History of parathyroidectomy 05/12/2021    Hyperlipidemia     Hypertension     MVP (mitral valve prolapse)      "Osteopenia     Osteoporosis     PUD (peptic ulcer disease)        Social History     Socioeconomic History    Marital status:    Tobacco Use    Smoking status: Never    Smokeless tobacco: Never   Vaping Use    Vaping status: Never Used   Substance and Sexual Activity    Alcohol use: No    Drug use: No       Family History   Problem Relation Age of Onset    Stroke Mother     Hypertension Mother     Diabetes Mother     Hypertension Other     Hyperlipidemia Other     Diabetes Other     Hypertension Child     Diabetes Child        Review of Systems   Constitutional: Positive for malaise/fatigue.   Eyes:         Admits good report with ophthalmologist, no visual changes since last visit   Cardiovascular:  Negative for chest pain, leg swelling, near-syncope and palpitations.   Respiratory:  Positive for shortness of breath.    Hematologic/Lymphatic: Negative for bleeding problem.   Musculoskeletal:  Positive for back pain and stiffness. Negative for falls.   Neurological:  Positive for dizziness and light-headedness.   Psychiatric/Behavioral:  Negative for memory loss. The patient is nervous/anxious.         BP Readings from Last 5 Encounters:   08/01/24 100/60   02/08/24 128/60   08/01/23 112/78   08/30/22 124/70   01/31/22 136/84       Wt Readings from Last 5 Encounters:   08/01/24 56.5 kg (124 lb 9.6 oz)   02/08/24 57 kg (125 lb 9.6 oz)   08/01/23 58.2 kg (128 lb 6.4 oz)   08/30/22 58.2 kg (128 lb 3.2 oz)   01/31/22 59.5 kg (131 lb 3.2 oz)       Objective     /60 (BP Location: Left arm, Patient Position: Sitting)   Pulse 70   Ht 149.9 cm (59.02\")   Wt 56.5 kg (124 lb 9.6 oz)   BMI 25.15 kg/m²     Vitals and nursing note reviewed.   Constitutional:       Appearance: Well-groomed. Frail.   HENT:      Head: Normocephalic.   Neck:      Vascular: No carotid bruit.   Pulmonary:      Effort: Pulmonary effort is normal.      Breath sounds: Normal breath sounds.   Cardiovascular:      PMI at left midclavicular " line. Normal rate. Regular rhythm.      Murmurs: There is no murmur.   Edema:     Ankle: bilateral trace edema of the ankle.  Abdominal:      General: Bowel sounds are normal.      Palpations: Abdomen is soft.   Musculoskeletal:         General: Deformity (Severe kyphosis) present.      Cervical back: Decreased range of motion. Skin:     General: Skin is warm and dry.   Neurological:      Mental Status: Alert, oriented to person, place, and time and oriented to person, place and time.   Psychiatric:         Speech: Speech normal.         Behavior: Behavior is cooperative.          Procedures: None today         Assessment & Plan   Diagnoses and all orders for this visit:    1. Primary hypertension (Primary)  -     CBC & Differential; Future  -     atenolol (TENORMIN) 25 MG tablet; Take 1 tablet by mouth 2 (Two) Times a Day.  Dispense: 90 tablet; Refill: 2    2. Mixed hyperlipidemia  -     Lipid Panel; Future  -     Comprehensive Metabolic Panel; Future    3. Vitamin B 12 deficiency    4. Old cerebrovascular accident (CVA) without late effect  -     Ambulatory Referral to Neurosurgery  -     clopidogrel (PLAVIX) 75 MG tablet; Take 1 tablet by mouth Daily.  Dispense: 90 tablet; Refill: 2    5. Chronic bilateral thoracic back pain  -     Ambulatory Referral to Neurosurgery    6. Other fatigue  -     Vitamin B12; Future  -     Comprehensive Metabolic Panel; Future  -     CBC & Differential; Future    7. Medication management  -     Vitamin B12; Future  -     Lipid Panel; Future  -     Comprehensive Metabolic Panel; Future  -     CBC & Differential; Future    8. Gastroesophageal reflux disease, unspecified whether esophagitis present  -     dicyclomine (BENTYL) 10 MG capsule; TAKE 1 CAPSULE BY MOUTH BEFORE MEALS AND AT BEDTIME AS NEEDED FOR STOMACH BLOATING  Dispense: 120 capsule; Refill: 2      Hypertension  -BP low normal  -Patient declines decreased dose of atenolol.  Currently taking 25 mg twice daily  -Patient  agrees to monitor blood pressure and call for concerns  -Adequate hydration encouraged    Hyperlipidemia  -Lab order given to include lipid panel  -Continue fish oil, diet    History CVA/history of ocular thrombus  -Followed by ophthalmologist at Dr. Fisher's office.  Admits to recent referral back to   -Continue Plavix  -Denies TIA symptoms or recent visual changes    Fatigue  -Check B12    GERD  -Continue diet management  -Symptoms improved with dicyclomine, refills given    Due to neck and back issues patient request referral back to neurosurgeon, Dr. Basil Ortiz    Currently patient appears stable from a cardiac standpoint.  Follow-up visit scheduled.               Electronically signed by AMARILIS Mcgill,  August 1, 2024 10:12 EDT    Dictated Utilizing Dragon Dictation: Part of this note may be an electronic transcription/translation of spoken language to printed text using the Dragon Dictation System.

## 2024-08-02 LAB — VIT B12 BLD-MCNC: 1602 PG/ML (ref 211–946)

## 2024-08-08 ENCOUNTER — TELEPHONE (OUTPATIENT)
Dept: CARDIOLOGY | Facility: CLINIC | Age: 84
End: 2024-08-08

## 2024-08-08 NOTE — TELEPHONE ENCOUNTER
Caller: CRISTINA TOUSSAINT    Relationship: Emergency Contact    Best call back number: [phone]     What is the best time to reach you: ANYTIME    Who are you requesting to speak with (clinical staff, provider,  specific staff member): PROVIDER OR CLINICAL STAFF      What was the call regarding: PT IS WORRIED ABOUT LAB TEST RESULTS AND WOULD LIKE SOMEONE TO CALL HER AND GO OVER THOSE RESULTS WITH HER.     Is it okay if the provider responds through [employer]: NO

## 2024-08-12 ENCOUNTER — TELEPHONE (OUTPATIENT)
Dept: CARDIOLOGY | Facility: CLINIC | Age: 84
End: 2024-08-12

## 2024-08-12 NOTE — TELEPHONE ENCOUNTER
I spoke with patient and she is aware of scheduled  appointment at [facility]surgery November 18,2024 at 11:20 am   . She feels she can not wait that time frame for an appointment and will follow up with PCP in reference to xrays and or MRI .

## 2024-08-12 NOTE — TELEPHONE ENCOUNTER
Caller: SILVIANO [facility]    Relationship:     Best call back number: [phone]    What was the call regarding: SILVIANO FROM [facility] CALLED TO ADVISE PROVIDER NEUROLOGY APPT IS SCHEDULED FOR NOVEMBER 18, 2024 AT 1120 WITH  ABDULKADIR GAUTHIER FOR THE EXTERNAL REFERRAL TO NEUROSURGERY. PLEASE CALL THE ABOVE NUMBER WITH ANY QUESTIONS.

## 2024-08-15 ENCOUNTER — TELEPHONE (OUTPATIENT)
Dept: CARDIOLOGY | Facility: CLINIC | Age: 84
End: 2024-08-15
Payer: MEDICARE

## 2024-08-15 NOTE — TELEPHONE ENCOUNTER
Attempted to contact, a male picked up the phone and after introducing self as Dr. Lara's office. Patient  is POA so asked if I was speaking with Mayra in which they denied and after requesting who I was speaking with they hung up. Attempted to contact again but no answer and voicemail box was full.

## 2024-08-15 NOTE — TELEPHONE ENCOUNTER
Per other TE, patient no longer wanted to be seen since the date was far out. Called and left detailed voicemail at providers office to cancel the appointment.

## 2024-08-15 NOTE — TELEPHONE ENCOUNTER
Hub staff attempted to follow warm transfer process and was unsuccessful     Caller: NEUROSURGERY    Best call back number: [phone]    Patient is needing:   NEUROSURGERY OFFICE CALLING TO GIVE APPOINTMENT INFO    NOVEMBER 18TH @ 11:20AM WITH ABDULKADIR GAUTHIER  [address] [place] [id number]    PATIENT IS NOT AWARE OF APPOINTMENT.

## 2025-01-30 DIAGNOSIS — I10 PRIMARY HYPERTENSION: ICD-10-CM

## 2025-01-30 DIAGNOSIS — Z86.73 OLD CEREBROVASCULAR ACCIDENT (CVA) WITHOUT LATE EFFECT: ICD-10-CM

## 2025-01-30 NOTE — TELEPHONE ENCOUNTER
Caller: CRISTINA TOUSSAINT    Relationship: Emergency Contact    Best call back number: [phone]    Requested Prescriptions:   Requested Prescriptions     Pending Prescriptions Disp Refills    atenolol (TENORMIN) 25 MG tablet 90 tablet 2     Sig: Take 1 tablet by mouth 2 (Two) Times a Day.    clopidogrel (PLAVIX) 75 MG tablet 90 tablet 2     Sig: Take 1 tablet by mouth Daily.        Pharmacy where request should be sent: [address] [phone] [facility] [phone] [facility]     Last office visit with prescribing clinician: 8/1/2024   Last telemedicine visit with prescribing clinician: Visit date not found   Next office visit with prescribing clinician: 8/5/2025   Does the patient have less than a 3 day supply:  [x] Yes  [] No    Would you like a call back once the refill request has been completed: [x] Yes [] No    If the office needs to give you a call back, can they leave a voicemail: [x] Yes [] No    H[place]el [place] Rep   01/30/25 08:29 EST

## 2025-01-31 RX ORDER — ATENOLOL 25 MG/1
25 TABLET ORAL 2 TIMES DAILY
Qty: 90 TABLET | Refills: 2 | Status: SHIPPED | OUTPATIENT
Start: 2025-01-31

## 2025-01-31 RX ORDER — CLOPIDOGREL BISULFATE 75 MG/1
75 TABLET ORAL DAILY
Qty: 90 TABLET | Refills: 2 | Status: SHIPPED | OUTPATIENT
Start: 2025-01-31

## 2025-01-31 NOTE — TELEPHONE ENCOUNTER
Hub staff attempted to follow warm transfer process and was unsuccessful     Caller: CRISTINA TOUSSAINT    Relationship to patient: Emergency Contact    Best call back number: [phone]    Patient is needing: STATES THEY WILL BE OUT OF THE ATENOLOL TODAY, ASKING THAT THIS BE CALLED IN BEFORE OFFICE CLOSES. IT IS NOW 11:55.

## 2025-02-05 RX ORDER — ATENOLOL 25 MG/1
25 TABLET ORAL 2 TIMES DAILY
Qty: 180 TABLET | Refills: 0 | OUTPATIENT
Start: 2025-02-05

## 2025-02-26 ENCOUNTER — TELEPHONE (OUTPATIENT)
Dept: CARDIOLOGY | Facility: CLINIC | Age: 85
End: 2025-02-26
Payer: MEDICARE

## 2025-02-26 NOTE — TELEPHONE ENCOUNTER
Received fax from [facility] for cardiac clearance for patient to have a T4 kypho. Patient is on Plavix and they are requesting to hold. According to our records, I do not see where patient has had any stenting. Patient has a history of CVA.          Fax [phone]

## 2025-04-01 DIAGNOSIS — K21.9 GASTROESOPHAGEAL REFLUX DISEASE, UNSPECIFIED WHETHER ESOPHAGITIS PRESENT: ICD-10-CM

## 2025-04-03 DIAGNOSIS — K21.9 GASTROESOPHAGEAL REFLUX DISEASE, UNSPECIFIED WHETHER ESOPHAGITIS PRESENT: ICD-10-CM

## 2025-04-03 RX ORDER — DICYCLOMINE HYDROCHLORIDE 10 MG/1
CAPSULE ORAL
Qty: 120 CAPSULE | Refills: 4 | OUTPATIENT
Start: 2025-04-03

## 2025-04-03 RX ORDER — DICYCLOMINE HYDROCHLORIDE 10 MG/1
CAPSULE ORAL
Qty: 120 CAPSULE | Refills: 2 | Status: SHIPPED | OUTPATIENT
Start: 2025-04-03

## 2025-05-12 DIAGNOSIS — I10 PRIMARY HYPERTENSION: ICD-10-CM

## 2025-05-13 RX ORDER — ATENOLOL 25 MG/1
25 TABLET ORAL 2 TIMES DAILY
Qty: 180 TABLET | Refills: 3 | Status: SHIPPED | OUTPATIENT
Start: 2025-05-13

## 2025-06-30 ENCOUNTER — TELEPHONE (OUTPATIENT)
Dept: CARDIOLOGY | Facility: CLINIC | Age: 85
End: 2025-06-30
Payer: MEDICARE

## 2025-06-30 NOTE — TELEPHONE ENCOUNTER
I spoke with Josephine at Dr Telles's office. Cat moved appointment up. Echo printed and put to be scanned in. Recent CTA of chest showed PE.

## 2025-06-30 NOTE — TELEPHONE ENCOUNTER
Hub staff attempted to follow warm transfer process and was unsuccessful     Caller: Mirna Bravo    Relationship to patient: Self    Best call back number: [phone]    Patient is needing: PROVIDER CALLED STATING PATIENT NEEDS APPOINTMENT DUE TO PULMONARY ARTERY HYPERTENSION, DILATION OF AORTIC ROOT, AND INCREASED SWELLING IN LOWER EXTREMITIES. REQUESTING FOLLOW UP ASAP. WARM TRANSFER UNSUCCESSFUL PLEASE RETURN PROVIDER CALL TO SCHEDULE.

## 2025-07-10 ENCOUNTER — OFFICE VISIT (OUTPATIENT)
Dept: CARDIOLOGY | Facility: CLINIC | Age: 85
End: 2025-07-10
Payer: MEDICARE

## 2025-07-10 ENCOUNTER — TELEPHONE (OUTPATIENT)
Dept: CARDIOLOGY | Facility: CLINIC | Age: 85
End: 2025-07-10

## 2025-07-10 ENCOUNTER — LAB (OUTPATIENT)
Dept: LAB | Facility: HOSPITAL | Age: 85
End: 2025-07-10
Payer: MEDICARE

## 2025-07-10 VITALS
OXYGEN SATURATION: 97 % | BODY MASS INDEX: 25.15 KG/M2 | SYSTOLIC BLOOD PRESSURE: 116 MMHG | HEIGHT: 59 IN | DIASTOLIC BLOOD PRESSURE: 70 MMHG | HEART RATE: 68 BPM

## 2025-07-10 DIAGNOSIS — R61 DIAPHORESIS: ICD-10-CM

## 2025-07-10 DIAGNOSIS — R53.83 OTHER FATIGUE: ICD-10-CM

## 2025-07-10 DIAGNOSIS — I10 PRIMARY HYPERTENSION: ICD-10-CM

## 2025-07-10 DIAGNOSIS — Z86.73 OLD CEREBROVASCULAR ACCIDENT (CVA) WITHOUT LATE EFFECT: ICD-10-CM

## 2025-07-10 DIAGNOSIS — K21.9 GASTROESOPHAGEAL REFLUX DISEASE, UNSPECIFIED WHETHER ESOPHAGITIS PRESENT: ICD-10-CM

## 2025-07-10 DIAGNOSIS — E78.2 MIXED HYPERLIPIDEMIA: ICD-10-CM

## 2025-07-10 DIAGNOSIS — E53.8 VITAMIN B 12 DEFICIENCY: ICD-10-CM

## 2025-07-10 DIAGNOSIS — E55.9 VITAMIN D INSUFFICIENCY: ICD-10-CM

## 2025-07-10 DIAGNOSIS — I10 PRIMARY HYPERTENSION: Primary | ICD-10-CM

## 2025-07-10 DIAGNOSIS — R06.02 SHORTNESS OF BREATH: ICD-10-CM

## 2025-07-10 DIAGNOSIS — R11.0 NAUSEA: ICD-10-CM

## 2025-07-10 LAB
ALBUMIN SERPL-MCNC: 3.3 G/DL (ref 3.5–5.2)
ALBUMIN/GLOB SERPL: 1 G/DL
ALP SERPL-CCNC: 99 U/L (ref 39–117)
ALT SERPL W P-5'-P-CCNC: 9 U/L (ref 1–33)
ANION GAP SERPL CALCULATED.3IONS-SCNC: 9.6 MMOL/L (ref 5–15)
AST SERPL-CCNC: 19 U/L (ref 1–32)
BASOPHILS # BLD AUTO: 0.03 10*3/MM3 (ref 0–0.2)
BASOPHILS NFR BLD AUTO: 0.4 % (ref 0–1.5)
BILIRUB SERPL-MCNC: 0.8 MG/DL (ref 0–1.2)
BUN SERPL-MCNC: 21.5 MG/DL (ref 8–23)
BUN/CREAT SERPL: 25.3 (ref 7–25)
CALCIUM SPEC-SCNC: 9.2 MG/DL (ref 8.6–10.5)
CHLORIDE SERPL-SCNC: 101 MMOL/L (ref 98–107)
CHOLEST SERPL-MCNC: 171 MG/DL (ref 0–200)
CO2 SERPL-SCNC: 28.4 MMOL/L (ref 22–29)
CREAT SERPL-MCNC: 0.85 MG/DL (ref 0.57–1)
DEPRECATED RDW RBC AUTO: 54.8 FL (ref 37–54)
EGFRCR SERPLBLD CKD-EPI 2021: 66.4 ML/MIN/1.73
EOSINOPHIL # BLD AUTO: 0.37 10*3/MM3 (ref 0–0.4)
EOSINOPHIL NFR BLD AUTO: 5 % (ref 0.3–6.2)
ERYTHROCYTE [DISTWIDTH] IN BLOOD BY AUTOMATED COUNT: 16 % (ref 12.3–15.4)
GLOBULIN UR ELPH-MCNC: 3.2 GM/DL
GLUCOSE SERPL-MCNC: 82 MG/DL (ref 65–99)
HCT VFR BLD AUTO: 36.3 % (ref 34–46.6)
HDLC SERPL-MCNC: 68 MG/DL (ref 40–60)
HGB BLD-MCNC: 11.2 G/DL (ref 12–15.9)
IMM GRANULOCYTES # BLD AUTO: 0.03 10*3/MM3 (ref 0–0.05)
IMM GRANULOCYTES NFR BLD AUTO: 0.4 % (ref 0–0.5)
LDLC SERPL CALC-MCNC: 89 MG/DL (ref 0–100)
LDLC/HDLC SERPL: 1.29 {RATIO}
LYMPHOCYTES # BLD AUTO: 3.02 10*3/MM3 (ref 0.7–3.1)
LYMPHOCYTES NFR BLD AUTO: 40.8 % (ref 19.6–45.3)
MAGNESIUM SERPL-MCNC: 1.8 MG/DL (ref 1.6–2.4)
MCH RBC QN AUTO: 28.5 PG (ref 26.6–33)
MCHC RBC AUTO-ENTMCNC: 30.9 G/DL (ref 31.5–35.7)
MCV RBC AUTO: 92.4 FL (ref 79–97)
MONOCYTES # BLD AUTO: 0.61 10*3/MM3 (ref 0.1–0.9)
MONOCYTES NFR BLD AUTO: 8.2 % (ref 5–12)
NEUTROPHILS NFR BLD AUTO: 3.35 10*3/MM3 (ref 1.7–7)
NEUTROPHILS NFR BLD AUTO: 45.2 % (ref 42.7–76)
NRBC BLD AUTO-RTO: 0 /100 WBC (ref 0–0.2)
PLATELET # BLD AUTO: 226 10*3/MM3 (ref 140–450)
PMV BLD AUTO: 11 FL (ref 6–12)
POTASSIUM SERPL-SCNC: 4.7 MMOL/L (ref 3.5–5.2)
PROT SERPL-MCNC: 6.5 G/DL (ref 6–8.5)
RBC # BLD AUTO: 3.93 10*6/MM3 (ref 3.77–5.28)
SODIUM SERPL-SCNC: 139 MMOL/L (ref 136–145)
T4 FREE SERPL-MCNC: 1.16 NG/DL (ref 0.92–1.68)
TRIGL SERPL-MCNC: 75 MG/DL (ref 0–150)
TSH SERPL DL<=0.05 MIU/L-ACNC: 3.4 UIU/ML (ref 0.27–4.2)
VLDLC SERPL-MCNC: 14 MG/DL (ref 5–40)
WBC NRBC COR # BLD AUTO: 7.41 10*3/MM3 (ref 3.4–10.8)

## 2025-07-10 PROCEDURE — 84439 ASSAY OF FREE THYROXINE: CPT

## 2025-07-10 PROCEDURE — 84443 ASSAY THYROID STIM HORMONE: CPT

## 2025-07-10 PROCEDURE — 80053 COMPREHEN METABOLIC PANEL: CPT

## 2025-07-10 PROCEDURE — 80061 LIPID PANEL: CPT

## 2025-07-10 PROCEDURE — 36415 COLL VENOUS BLD VENIPUNCTURE: CPT

## 2025-07-10 PROCEDURE — 85025 COMPLETE CBC W/AUTO DIFF WBC: CPT

## 2025-07-10 PROCEDURE — 83735 ASSAY OF MAGNESIUM: CPT

## 2025-07-10 PROCEDURE — 82607 VITAMIN B-12: CPT

## 2025-07-10 PROCEDURE — 82306 VITAMIN D 25 HYDROXY: CPT

## 2025-07-10 RX ORDER — POTASSIUM CHLORIDE 750 MG/1
10 TABLET, EXTENDED RELEASE ORAL DAILY
Qty: 30 TABLET | Refills: 11 | Status: SHIPPED | OUTPATIENT
Start: 2025-07-10

## 2025-07-10 RX ORDER — FUROSEMIDE 20 MG/1
20 TABLET ORAL DAILY
Qty: 90 TABLET | Refills: 3 | Status: SHIPPED | OUTPATIENT
Start: 2025-07-10

## 2025-07-10 RX ORDER — CLOPIDOGREL BISULFATE 75 MG/1
75 TABLET ORAL DAILY
Qty: 90 TABLET | Refills: 2 | Status: SHIPPED | OUTPATIENT
Start: 2025-07-10

## 2025-07-10 RX ORDER — DICYCLOMINE HYDROCHLORIDE 10 MG/1
CAPSULE ORAL
Qty: 120 CAPSULE | Refills: 2 | Status: SHIPPED | OUTPATIENT
Start: 2025-07-10

## 2025-07-10 NOTE — TELEPHONE ENCOUNTER
Patient made aware stress test has been canceled. Need to wait on her pulmonary embolisms to resolve before moving forward with this and we may not even have to after that occurs. Patient voiced understanding.

## 2025-07-10 NOTE — TELEPHONE ENCOUNTER
I have canceled her stress test.  We need to wait on her pulmonary embolisms to resolve before moving forward with this and we may not even have to after that occurs.  Please call and let her know

## 2025-07-10 NOTE — PROGRESS NOTES
Chief Complaint   Patient presents with    Follow-up     Cardiac Management: Pt states no chest pain, since being on oxygen - no SOA    Med Refill     Cardiac Medication Refills Pending and ready for review    No Aspirin daily    Patient did not bring med list or medicine bottles to appointment, med list has been reviewed and updated based on patient's knowledge of their meds.      Labs Only     Last Labs 8/2024    Leg Swelling     Left leg increased swelling, right as well slightly less swelling     Fatigue     if doing activity for 15 mins or more will get cold,  clammy, weak, sometimes nauseous. Seems to improve after resting for a while.        HPI:  NATALIE Bravo is a 85 y.o. female with HTN, hypercholesterolemia, and MR.  In 2013 diagnosed stroke right occipital lobe.  Carotid ultrasound echo were unremarkable.  She is unable to tolerate statin therapy.  Echocardiogram in 2017 showed normal EF and mild to moderate MR.  2020 she underwent back surgery without problems.  In 2021 she underwent removal of 1 parathyroid gland.  Later diagnosed with GERD, symptoms improved with Pepcid.  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  Eliquis prescribed.  She underwent rehab at [facility] due to deconditioning.      She returns today for follow-up visit.  Labs 8/2024: Creatinine 0.98, GFR 56.3, total cholesterol 202, HDL 65, , B12 1602. She reports episodes of SOB, fatrigue, weakness, nausea, diaphoresis. Having swelling.  She had a recent ER visit 6/23/2025 for SOB.  She did have an echocardiogram which showed ejection fraction 55 to 60%, mild mild MV prolapse, moderate mitral regurgitation, RVSP 64 mmHg. Newly on O2 2L, CTA chest revealed severe calcifications in coronary arteries, large eccentric age indeterminant PE in the left main pulmonary artery with additional PE and left lower lobe PE discovered.  She was  discharged on anticoagulation, Eliquis.    Cardiac History:    Past Surgical History:   Procedure Laterality Date    CARDIAC CATHETERIZATION      CARDIOVASCULAR STRESS TEST  06/06/2007    4 min,16 secs., 80% THR, Negative.    ECHO - CONVERTED  06/06/2007    EF >60%, mild to moderate MR.    ECHO - CONVERTED  07/01/2009    EF 50-55%, redundant mitral valve.    ECHO - CONVERTED  03/07/2013    [facility]- EF 65%. Septal WMA.    ECHO - CONVERTED  07/06/2017    EF 55-60%, mild to mod MR     ECHO - CONVERTED  07/05/2021    EF 60%. MVP Mod MR. RVSP- 40 mmHg    ECHO - CONVERTED  06/23/2025    @[facility]: EF 55 to 60%, mild MV prolapse, moderate MR, RVSP 64 mmHg    MRI INTRAOPERATIVE BRAIN W/ OR W/O CONTRAST  03/06/2013    Subacute Stroke in R Occipital Lobe.    US CAROTID UNILATERAL  03/07/2013    Normal       Current Outpatient Medications   Medication Sig Dispense Refill    apixaban (Eliquis) 5 MG tablet tablet Take 1 tablet twice a day by oral route for 30 days.      Artificial Tear Ointment (artificial tears) ophthalmic ointment Administer  to the right eye Every 1 (One) Hour As Needed.      atenolol (TENORMIN) 25 MG tablet Take 1 tablet by mouth twice daily 180 tablet 3    Calcium Citrate-Vitamin D (CALCIUM CITRATE + D PO) Take  by mouth Daily.      clopidogrel (PLAVIX) 75 MG tablet Take 1 tablet by mouth Daily. 90 tablet 2    dicyclomine (BENTYL) 10 MG capsule TAKE 1 CAPSULE BY MOUTH BEFORE MEALS AND AT BEDTIME AS NEEDED FOR STOMACH BLOATING 120 capsule 2    famotidine (PEPCID) 20 MG tablet Take 1 tablet by mouth 2 (Two) Times a Day.      GARLIC PO Take 1,000 mg by mouth Daily.      latanoprost (XALATAN) 0.005 % ophthalmic solution Administer 1 drop to the right eye Every Night.      Omega-3 Fatty Acids (FISH OIL) 1200 MG capsule delayed-release Take 1 capsule by mouth 2 (Two) Times a Day.      ondansetron (ZOFRAN) 4 MG tablet Take 1 tablet by mouth Every 8 (Eight) Hours As Needed for Nausea or Vomiting. 90 tablet 3    furosemide  "(LASIX) 20 MG tablet Take 1 tablet by mouth Daily. 90 tablet 3    potassium chloride 10 MEQ CR tablet Take 1 tablet by mouth Daily. 30 tablet 11     No current facility-administered medications for this visit.       Tegretol [carbamazepine], Hydrocodone, Codeine, Imdur [isosorbide nitrate], Levaquin [levofloxacin], Niaspan [niacin er (antihyperlipidemic)], Penicillins, Pravastatin, Tramadol, Vytorin [ezetimibe-simvastatin], and Zocor [simvastatin]    Past Medical History:   Diagnosis Date    Acid reflux     Broken humerus     CVA (cerebral infarction)     DVT of proximal leg (deep vein thrombosis)     Left leg    History of bilateral cataract extraction     History of kyphoplasty     History of parathyroidectomy 05/12/2021    Hyperlipidemia     Hypertension     MVP (mitral valve prolapse)     Osteopenia     Osteoporosis     PUD (peptic ulcer disease)        Social History     Socioeconomic History    Marital status:    Tobacco Use    Smoking status: Never    Smokeless tobacco: Never   Vaping Use    Vaping status: Never Used   Substance and Sexual Activity    Alcohol use: No    Drug use: No       Family History   Problem Relation Age of Onset    Stroke Mother     Hypertension Mother     Diabetes Mother     Hypertension Other     Hyperlipidemia Other     Diabetes Other     Hypertension Child     Diabetes Child        Vitals:   /70 (BP Location: Right arm, Patient Position: Sitting, Cuff Size: Adult)   Pulse 68   Ht 149.9 cm (59.02\")   SpO2 97%   BMI 25.15 kg/m²     Physical Exam  Vitals and nursing note reviewed.   Neck:      Vascular: No carotid bruit.   Cardiovascular:      Rate and Rhythm: Normal rate and regular rhythm.      Pulses: Normal pulses.      Heart sounds: Murmur heard.      No friction rub. No gallop.   Pulmonary:      Effort: Pulmonary effort is normal.      Breath sounds: Normal breath sounds and air entry.   Musculoskeletal:      Right lower leg: No edema.      Left lower leg: No " edema.   Skin:     General: Skin is warm and dry.      Capillary Refill: Capillary refill takes less than 2 seconds.   Neurological:      Mental Status: She is alert and oriented to person, place, and time.       Procedures     Assessment & Plan     Diagnoses and all orders for this visit:    1. Primary hypertension (Primary)  -     CBC & Differential; Future  -     Comprehensive Metabolic Panel; Future  -     Magnesium; Future  -     T4, Free; Future  -     TSH; Future  -     Vitamin B12; Future    2. Gastroesophageal reflux disease, unspecified whether esophagitis present  -     dicyclomine (BENTYL) 10 MG capsule; TAKE 1 CAPSULE BY MOUTH BEFORE MEALS AND AT BEDTIME AS NEEDED FOR STOMACH BLOATING  Dispense: 120 capsule; Refill: 2    3. Old cerebrovascular accident (CVA) without late effect  -     clopidogrel (PLAVIX) 75 MG tablet; Take 1 tablet by mouth Daily.  Dispense: 90 tablet; Refill: 2    4. Mixed hyperlipidemia  -     Lipid Panel; Future    5. Shortness of breath  -     Cancel: Stress Test With Myocardial Perfusion One Day; Future    6. Nausea  -     Cancel: Stress Test With Myocardial Perfusion One Day; Future    7. Diaphoresis  -     Cancel: Stress Test With Myocardial Perfusion One Day; Future    8. Other fatigue  -     Comprehensive Metabolic Panel; Future  -     Magnesium; Future  -     T4, Free; Future  -     TSH; Future  -     Vitamin B12; Future  -     Vitamin D,25-Hydroxy; Future    9. Vitamin B 12 deficiency  -     Vitamin B12; Future    10. Vitamin D insufficiency  -     Vitamin D,25-Hydroxy; Future    Other orders  -     furosemide (LASIX) 20 MG tablet; Take 1 tablet by mouth Daily.  Dispense: 90 tablet; Refill: 3  -     potassium chloride 10 MEQ CR tablet; Take 1 tablet by mouth Daily.  Dispense: 30 tablet; Refill: 11    HTN  - BP controlled  - Continue atenolol    Hyperlipidemia  - Lipid panel ordered  - Managed with lifestyle    Shortness of breath/nausea/diaphoresis/fatigue  - Recent  discovery of multiple pulmonary embolisms, likely cause of symptoms  - Currently on anticoagulation, Eliquis  - She has started supplemental oxygen, 2 L continuous via nasal cannula  - Echo revealed RVSP 64 mmHg  - Begin Lasix and potassium    Stable from a cardiac standpoint. Start lasix and potassium daily. Labs ordered for monitoring. Send results to PCP and mail patient copy.      Visit Diagnoses:    ICD-10-CM ICD-9-CM   1. Primary hypertension  I10 401.9   2. Gastroesophageal reflux disease, unspecified whether esophagitis present  K21.9 530.81   3. Old cerebrovascular accident (CVA) without late effect  Z86.73 V12.54   4. Mixed hyperlipidemia  E78.2 272.2   5. Shortness of breath  R06.02 786.05   6. Nausea  R11.0 787.02   7. Diaphoresis  R61 780.8   8. Other fatigue  R53.83 780.79   9. Vitamin B 12 deficiency  E53.8 266.2   10. Vitamin D insufficiency  E55.9 268.9       Meds Ordered During Visit:  New Medications Ordered This Visit   Medications    dicyclomine (BENTYL) 10 MG capsule     Sig: TAKE 1 CAPSULE BY MOUTH BEFORE MEALS AND AT BEDTIME AS NEEDED FOR STOMACH BLOATING     Dispense:  120 capsule     Refill:  2    clopidogrel (PLAVIX) 75 MG tablet     Sig: Take 1 tablet by mouth Daily.     Dispense:  90 tablet     Refill:  2    furosemide (LASIX) 20 MG tablet     Sig: Take 1 tablet by mouth Daily.     Dispense:  90 tablet     Refill:  3    potassium chloride 10 MEQ CR tablet     Sig: Take 1 tablet by mouth Daily.     Dispense:  30 tablet     Refill:  11       Follow Up Appointment:   Return in about 6 months (around 1/10/2026), or if symptoms worsen or fail to improve.           This document has been electronically signed by AMARILIS Mcgee  July 10, 2025 15:11 EDT    Dictated Utilizing Dragon Dictation: Part of this note may be an electronic transcription/translation of spoken language to printed text using the Dragon Dictation System.

## 2025-07-10 NOTE — TELEPHONE ENCOUNTER
Attempted to notify patient of recommendations, unable to reach and unable to leave message due to voice mail box is full.

## 2025-07-11 LAB
25(OH)D3 SERPL-MCNC: 76.2 NG/ML (ref 30–100)
VIT B12 BLD-MCNC: 294 PG/ML (ref 211–946)